# Patient Record
Sex: FEMALE | Race: WHITE | NOT HISPANIC OR LATINO | Employment: PART TIME | URBAN - METROPOLITAN AREA
[De-identification: names, ages, dates, MRNs, and addresses within clinical notes are randomized per-mention and may not be internally consistent; named-entity substitution may affect disease eponyms.]

---

## 2017-02-10 ENCOUNTER — TRANSCRIBE ORDERS (OUTPATIENT)
Dept: OCCUPATIONAL MEDICINE | Facility: CLINIC | Age: 32
End: 2017-02-10

## 2017-02-10 ENCOUNTER — APPOINTMENT (OUTPATIENT)
Dept: OCCUPATIONAL MEDICINE | Facility: CLINIC | Age: 32
End: 2017-02-10

## 2017-02-10 DIAGNOSIS — Z02.1 PRE-EMPLOYMENT HEALTH SCREENING EXAMINATION: Primary | ICD-10-CM

## 2017-02-10 DIAGNOSIS — Z02.1 PRE-EMPLOYMENT HEALTH SCREENING EXAMINATION: ICD-10-CM

## 2017-02-10 PROCEDURE — 86787 VARICELLA-ZOSTER ANTIBODY: CPT

## 2017-02-10 PROCEDURE — 86480 TB TEST CELL IMMUN MEASURE: CPT

## 2017-02-10 PROCEDURE — 86765 RUBEOLA ANTIBODY: CPT

## 2017-02-10 PROCEDURE — 86762 RUBELLA ANTIBODY: CPT

## 2017-02-10 PROCEDURE — 36415 COLL VENOUS BLD VENIPUNCTURE: CPT

## 2017-02-10 PROCEDURE — 86735 MUMPS ANTIBODY: CPT

## 2017-02-11 LAB — RUBV IGG SERPL IA-ACNC: 31.9 IU/ML

## 2017-02-14 LAB
ANNOTATION COMMENT IMP: NORMAL
GAMMA INTERFERON BACKGROUND BLD IA-ACNC: 0.05 IU/ML
M TB IFN-G BLD-IMP: NEGATIVE
M TB IFN-G CD4+ BCKGRND COR BLD-ACNC: 0 IU/ML
M TB IFN-G CD4+ T-CELLS BLD-ACNC: 0.05 IU/ML
MEV IGG SER QL: ABNORMAL
MITOGEN IGNF BLD-ACNC: >10 IU/ML
MUV IGG SER QL: NORMAL
QUANTIFERON-TB GOLD IN TUBE: NORMAL
SERVICE CMNT-IMP: NORMAL
VZV IGG SER IA-ACNC: NORMAL

## 2017-05-04 ENCOUNTER — ALLSCRIPTS OFFICE VISIT (OUTPATIENT)
Dept: OTHER | Facility: OTHER | Age: 32
End: 2017-05-04

## 2017-05-12 ENCOUNTER — ALLSCRIPTS OFFICE VISIT (OUTPATIENT)
Dept: OTHER | Facility: OTHER | Age: 32
End: 2017-05-12

## 2017-05-19 ENCOUNTER — GENERIC CONVERSION - ENCOUNTER (OUTPATIENT)
Dept: OTHER | Facility: OTHER | Age: 32
End: 2017-05-19

## 2017-05-19 LAB
EXTERNAL HEPATITIS B SURFACE ANTIGEN: NEGATIVE
EXTERNAL RUBELLA IGG QUANTITATION: NORMAL

## 2017-05-20 LAB
ABO GROUP BLD: ABNORMAL
BACTERIA UR QL AUTO: ABNORMAL
BASOPHILS # BLD AUTO: 0 %
BASOPHILS # BLD AUTO: 0 X10E3/UL (ref 0–0.2)
BILIRUB UR QL STRIP: NEGATIVE
BLD GP AB SCN SERPL QL: NEGATIVE
COLOR UR: YELLOW
COMMENT (HISTORICAL): CLEAR
DEPRECATED RDW RBC AUTO: 12.6 % (ref 12.3–15.4)
EOSINOPHIL # BLD AUTO: 0.1 X10E3/UL (ref 0–0.4)
EOSINOPHIL # BLD AUTO: 1 %
FECAL OCCULT BLOOD DIAGNOSTIC (HISTORICAL): NEGATIVE
GLUCOSE (HISTORICAL): NEGATIVE
HCT VFR BLD AUTO: 37.4 % (ref 34–46.6)
HEPATITIS B SURFACE ANTIGEN (HISTORICAL): NEGATIVE
HGB BLD-MCNC: 12.4 G/DL (ref 11.1–15.9)
HIV-1/2 AB-AG (HISTORICAL): NON REACTIVE
IMM.GRANULOCYTES (CD4/8) (HISTORICAL): 0 %
IMM.GRANULOCYTES (CD4/8) (HISTORICAL): 0 X10E3/UL (ref 0–0.1)
KETONES UR STRIP-MCNC: NEGATIVE MG/DL
LEUKOCYTE ESTERASE UR QL STRIP: NEGATIVE
LYMPHOCYTES # BLD AUTO: 2.2 X10E3/UL (ref 0.7–3.1)
LYMPHOCYTES # BLD AUTO: 24 %
MCH RBC QN AUTO: 32.5 PG (ref 26.6–33)
MCHC RBC AUTO-ENTMCNC: 33.2 G/DL (ref 31.5–35.7)
MCV RBC AUTO: 98 FL (ref 79–97)
MICROSCOPIC EXAMINATION (HISTORICAL): ABNORMAL
MICROSCOPIC EXAMINATION (HISTORICAL): ABNORMAL
MONOCYTES # BLD AUTO: 0.5 X10E3/UL (ref 0.1–0.9)
MONOCYTES (HISTORICAL): 6 %
NEUTROPHILS # BLD AUTO: 6.2 X10E3/UL (ref 1.4–7)
NEUTROPHILS # BLD AUTO: 69 %
NITRITE UR QL STRIP: NEGATIVE
NON-SQ EPI CELLS URNS QL MICRO: ABNORMAL /HPF
PH UR STRIP.AUTO: 7 [PH] (ref 5–7.5)
PLATELET # BLD AUTO: 230 X10E3/UL (ref 150–379)
PROT UR STRIP-MCNC: NEGATIVE MG/DL
RBC (HISTORICAL): 3.81 X10E6/UL (ref 3.77–5.28)
RBC (HISTORICAL): ABNORMAL /HPF
RH BLD: POSITIVE
RPR SCREEN (HISTORICAL): NON REACTIVE
RUBELLA, IGG (HISTORICAL): 1.5 INDEX
SP GR UR STRIP.AUTO: <=1.005 (ref 1–1.03)
URINALYSIS (UA) (HISTORICAL): ABNORMAL
UROBILINOGEN UR QL STRIP.AUTO: 0.2 EU/DL (ref 0.2–1)
WBC # BLD AUTO: 9.1 X10E3/UL (ref 3.4–10.8)
WBC # BLD AUTO: ABNORMAL /HPF

## 2017-05-28 ENCOUNTER — GENERIC CONVERSION - ENCOUNTER (OUTPATIENT)
Dept: OTHER | Facility: OTHER | Age: 32
End: 2017-05-28

## 2017-06-02 ENCOUNTER — ALLSCRIPTS OFFICE VISIT (OUTPATIENT)
Dept: PERINATAL CARE | Facility: CLINIC | Age: 32
End: 2017-06-02
Payer: COMMERCIAL

## 2017-06-02 ENCOUNTER — GENERIC CONVERSION - ENCOUNTER (OUTPATIENT)
Dept: OTHER | Facility: OTHER | Age: 32
End: 2017-06-02

## 2017-06-02 ENCOUNTER — ALLSCRIPTS OFFICE VISIT (OUTPATIENT)
Dept: OTHER | Facility: OTHER | Age: 32
End: 2017-06-02

## 2017-06-02 LAB
GLUCOSE (HISTORICAL): NORMAL
LEUKOCYTE ESTERASE UR QL STRIP: NORMAL
NITRITE UR QL STRIP: NORMAL
PROT UR STRIP-MCNC: NORMAL MG/DL

## 2017-06-02 PROCEDURE — G0463 HOSPITAL OUTPT CLINIC VISIT: HCPCS | Performed by: GENETIC COUNSELOR, MS

## 2017-06-02 PROCEDURE — 76813 OB US NUCHAL MEAS 1 GEST: CPT | Performed by: OBSTETRICS & GYNECOLOGY

## 2017-06-02 PROCEDURE — 99203 OFFICE O/P NEW LOW 30 MIN: CPT | Performed by: GENETIC COUNSELOR, MS

## 2017-06-02 PROCEDURE — 76801 OB US < 14 WKS SINGLE FETUS: CPT | Performed by: OBSTETRICS & GYNECOLOGY

## 2017-06-05 ENCOUNTER — GENERIC CONVERSION - ENCOUNTER (OUTPATIENT)
Dept: OTHER | Facility: OTHER | Age: 32
End: 2017-06-05

## 2017-06-06 LAB
CHLAMYDIA TRACHOMATIS BY MOL. METHOD (HISTORICAL): NEGATIVE
N GONORRHOEAE, AMPLIFIED DNA (HISTORICAL): NEGATIVE

## 2017-06-08 LAB
ADEQUACY: (HISTORICAL): NORMAL
CLINICIAN PROVIDIED ICD 9 OR 10 (HISTORICAL): NORMAL
COMMENT (HISTORICAL): NORMAL
DIAGNOSIS (HISTORICAL): NORMAL
HPV HIGH RISK (HISTORICAL): NEGATIVE
NOTE: (HISTORICAL): NORMAL
PERFORMED BY (HISTORICAL): NORMAL
TEST INFORMATION (HISTORICAL): NORMAL

## 2017-06-26 ENCOUNTER — TRANSCRIBE ORDERS (OUTPATIENT)
Dept: LAB | Facility: CLINIC | Age: 32
End: 2017-06-26

## 2017-06-26 ENCOUNTER — APPOINTMENT (OUTPATIENT)
Dept: LAB | Facility: CLINIC | Age: 32
End: 2017-06-26
Payer: COMMERCIAL

## 2017-06-26 DIAGNOSIS — Z33.1 PREGNANT STATE, INCIDENTAL: ICD-10-CM

## 2017-06-26 DIAGNOSIS — Z33.1 PREGNANT STATE, INCIDENTAL: Primary | ICD-10-CM

## 2017-06-26 PROCEDURE — 36415 COLL VENOUS BLD VENIPUNCTURE: CPT

## 2017-06-27 LAB — SCAN RESULT: NORMAL

## 2017-06-30 ENCOUNTER — GENERIC CONVERSION - ENCOUNTER (OUTPATIENT)
Dept: OTHER | Facility: OTHER | Age: 32
End: 2017-06-30

## 2017-07-07 ENCOUNTER — ALLSCRIPTS OFFICE VISIT (OUTPATIENT)
Dept: OTHER | Facility: OTHER | Age: 32
End: 2017-07-07

## 2017-07-07 LAB
COLOR UR: CLEAR
GLUCOSE (HISTORICAL): NEGATIVE
LEUKOCYTE ESTERASE UR QL STRIP: NEGATIVE
NITRITE UR QL STRIP: NEGATIVE
PROT UR STRIP-MCNC: NEGATIVE MG/DL

## 2017-07-14 ENCOUNTER — ALLSCRIPTS OFFICE VISIT (OUTPATIENT)
Dept: OTHER | Facility: OTHER | Age: 32
End: 2017-07-14

## 2017-07-25 ENCOUNTER — GENERIC CONVERSION - ENCOUNTER (OUTPATIENT)
Dept: OTHER | Facility: OTHER | Age: 32
End: 2017-07-25

## 2017-07-25 ENCOUNTER — ALLSCRIPTS OFFICE VISIT (OUTPATIENT)
Dept: PERINATAL CARE | Facility: CLINIC | Age: 32
End: 2017-07-25
Payer: COMMERCIAL

## 2017-07-25 PROCEDURE — 76817 TRANSVAGINAL US OBSTETRIC: CPT | Performed by: OBSTETRICS & GYNECOLOGY

## 2017-07-25 PROCEDURE — 76805 OB US >/= 14 WKS SNGL FETUS: CPT | Performed by: OBSTETRICS & GYNECOLOGY

## 2017-08-01 ENCOUNTER — ALLSCRIPTS OFFICE VISIT (OUTPATIENT)
Dept: OTHER | Facility: OTHER | Age: 32
End: 2017-08-01

## 2017-08-01 LAB
GLUCOSE (HISTORICAL): NEGATIVE
LEUKOCYTE ESTERASE UR QL STRIP: NEGATIVE
NITRITE UR QL STRIP: NEGATIVE
PROT UR STRIP-MCNC: NEGATIVE MG/DL

## 2017-08-29 ENCOUNTER — ALLSCRIPTS OFFICE VISIT (OUTPATIENT)
Dept: OTHER | Facility: OTHER | Age: 32
End: 2017-08-29

## 2017-09-11 ENCOUNTER — HOSPITAL ENCOUNTER (OUTPATIENT)
Facility: HOSPITAL | Age: 32
Discharge: HOME | End: 2017-09-11
Attending: OBSTETRICS & GYNECOLOGY | Admitting: OBSTETRICS & GYNECOLOGY
Payer: COMMERCIAL

## 2017-09-11 VITALS
BODY MASS INDEX: 25.48 KG/M2 | WEIGHT: 182 LBS | HEART RATE: 71 BPM | SYSTOLIC BLOOD PRESSURE: 134 MMHG | DIASTOLIC BLOOD PRESSURE: 60 MMHG | OXYGEN SATURATION: 97 % | RESPIRATION RATE: 18 BRPM | TEMPERATURE: 97.5 F | HEIGHT: 71 IN

## 2017-09-11 DIAGNOSIS — O36.8190 DECREASED FETAL MOVEMENT: ICD-10-CM

## 2017-09-11 DIAGNOSIS — Z3A.25 25 WEEKS GESTATION OF PREGNANCY: ICD-10-CM

## 2017-09-11 PROCEDURE — 99214 OFFICE O/P EST MOD 30 MIN: CPT

## 2017-09-11 PROCEDURE — 59025 FETAL NON-STRESS TEST: CPT | Performed by: OBSTETRICS & GYNECOLOGY

## 2017-09-11 PROCEDURE — 76815 OB US LIMITED FETUS(S): CPT | Performed by: OBSTETRICS & GYNECOLOGY

## 2017-09-11 RX ORDER — FOLIC ACID 1 MG/1
1 TABLET ORAL DAILY
COMMUNITY
End: 2017-11-29 | Stop reason: HOSPADM

## 2017-09-18 ENCOUNTER — TRANSCRIBE ORDERS (OUTPATIENT)
Dept: LAB | Facility: CLINIC | Age: 32
End: 2017-09-18

## 2017-09-18 ENCOUNTER — APPOINTMENT (OUTPATIENT)
Dept: LAB | Facility: CLINIC | Age: 32
End: 2017-09-18
Payer: COMMERCIAL

## 2017-09-18 DIAGNOSIS — Z34.90 PREGNANCY WITH ONE FETUS, UNSPECIFIED TRIMESTER: ICD-10-CM

## 2017-09-18 DIAGNOSIS — Z34.90 PREGNANCY WITH ONE FETUS, UNSPECIFIED TRIMESTER: Primary | ICD-10-CM

## 2017-09-18 LAB
BASOPHILS # BLD AUTO: 0.02 THOUSANDS/ΜL (ref 0–0.1)
BASOPHILS NFR BLD AUTO: 0 % (ref 0–1)
EOSINOPHIL # BLD AUTO: 0.2 THOUSAND/ΜL (ref 0–0.61)
EOSINOPHIL NFR BLD AUTO: 2 % (ref 0–6)
ERYTHROCYTE [DISTWIDTH] IN BLOOD BY AUTOMATED COUNT: 12.5 % (ref 11.6–15.1)
GLUCOSE 1H P 50 G GLC PO SERPL-MCNC: 138 MG/DL
HCT VFR BLD AUTO: 34.2 % (ref 34.8–46.1)
HGB BLD-MCNC: 12 G/DL (ref 11.5–15.4)
LYMPHOCYTES # BLD AUTO: 1.97 THOUSANDS/ΜL (ref 0.6–4.47)
LYMPHOCYTES NFR BLD AUTO: 23 % (ref 14–44)
MCH RBC QN AUTO: 33.8 PG (ref 26.8–34.3)
MCHC RBC AUTO-ENTMCNC: 35.1 G/DL (ref 31.4–37.4)
MCV RBC AUTO: 96 FL (ref 82–98)
MONOCYTES # BLD AUTO: 0.56 THOUSAND/ΜL (ref 0.17–1.22)
MONOCYTES NFR BLD AUTO: 7 % (ref 4–12)
NEUTROPHILS # BLD AUTO: 5.85 THOUSANDS/ΜL (ref 1.85–7.62)
NEUTS SEG NFR BLD AUTO: 68 % (ref 43–75)
NRBC BLD AUTO-RTO: 0 /100 WBCS
PLATELET # BLD AUTO: 201 THOUSANDS/UL (ref 149–390)
PMV BLD AUTO: 11.2 FL (ref 8.9–12.7)
RBC # BLD AUTO: 3.55 MILLION/UL (ref 3.81–5.12)
WBC # BLD AUTO: 8.66 THOUSAND/UL (ref 4.31–10.16)

## 2017-09-18 PROCEDURE — 82950 GLUCOSE TEST: CPT

## 2017-09-18 PROCEDURE — 85025 COMPLETE CBC W/AUTO DIFF WBC: CPT

## 2017-09-18 PROCEDURE — 86592 SYPHILIS TEST NON-TREP QUAL: CPT

## 2017-09-18 PROCEDURE — 87389 HIV-1 AG W/HIV-1&-2 AB AG IA: CPT

## 2017-09-18 PROCEDURE — 36415 COLL VENOUS BLD VENIPUNCTURE: CPT

## 2017-09-19 ENCOUNTER — GENERIC CONVERSION - ENCOUNTER (OUTPATIENT)
Dept: OTHER | Facility: OTHER | Age: 32
End: 2017-09-19

## 2017-09-19 DIAGNOSIS — R73.02 IMPAIRED GLUCOSE TOLERANCE: ICD-10-CM

## 2017-09-19 LAB — RPR SER QL: NORMAL

## 2017-09-20 LAB — HIV 1+2 AB+HIV1 P24 AG SERPL QL IA: NORMAL

## 2017-09-23 ENCOUNTER — TRANSCRIBE ORDERS (OUTPATIENT)
Dept: LAB | Facility: HOSPITAL | Age: 32
End: 2017-09-23

## 2017-09-24 ENCOUNTER — APPOINTMENT (OUTPATIENT)
Dept: LAB | Facility: HOSPITAL | Age: 32
End: 2017-09-24
Attending: NURSE PRACTITIONER
Payer: COMMERCIAL

## 2017-09-24 DIAGNOSIS — R73.02 IMPAIRED GLUCOSE TOLERANCE: ICD-10-CM

## 2017-09-24 LAB
GLUCOSE 1H P 100 G GLC PO SERPL-MCNC: 147 MG/DL (ref 65–179)
GLUCOSE 2H P 100 G GLC PO SERPL-MCNC: 144 MG/DL (ref 65–154)
GLUCOSE 3H P 100 G GLC PO SERPL-MCNC: 105 MG/DL (ref 65–139)
GLUCOSE P FAST SERPL-MCNC: 83 MG/DL (ref 65–99)

## 2017-09-24 PROCEDURE — 82948 REAGENT STRIP/BLOOD GLUCOSE: CPT

## 2017-09-24 PROCEDURE — 36415 COLL VENOUS BLD VENIPUNCTURE: CPT

## 2017-09-24 PROCEDURE — 82951 GLUCOSE TOLERANCE TEST (GTT): CPT

## 2017-09-24 PROCEDURE — 82952 GTT-ADDED SAMPLES: CPT

## 2017-09-26 ENCOUNTER — ALLSCRIPTS OFFICE VISIT (OUTPATIENT)
Dept: OTHER | Facility: OTHER | Age: 32
End: 2017-09-26

## 2017-09-26 LAB
GLUCOSE (HISTORICAL): NEGATIVE
GLUCOSE SERPL-MCNC: 75 MG/DL (ref 65–140)
LEUKOCYTE ESTERASE UR QL STRIP: NEGATIVE
NITRITE UR QL STRIP: NEGATIVE
PROT UR STRIP-MCNC: NEGATIVE MG/DL

## 2017-09-29 ENCOUNTER — ALLSCRIPTS OFFICE VISIT (OUTPATIENT)
Dept: OTHER | Facility: OTHER | Age: 32
End: 2017-09-29

## 2017-10-24 ENCOUNTER — ALLSCRIPTS OFFICE VISIT (OUTPATIENT)
Dept: PERINATAL CARE | Facility: CLINIC | Age: 32
End: 2017-10-24
Payer: COMMERCIAL

## 2017-10-24 ENCOUNTER — ALLSCRIPTS OFFICE VISIT (OUTPATIENT)
Dept: OTHER | Facility: OTHER | Age: 32
End: 2017-10-24

## 2017-10-24 LAB
GLUCOSE (HISTORICAL): NORMAL
LEUKOCYTE ESTERASE UR QL STRIP: NEGATIVE
NITRITE UR QL STRIP: NEGATIVE
PROT UR STRIP-MCNC: NEGATIVE MG/DL

## 2017-10-24 PROCEDURE — 76816 OB US FOLLOW-UP PER FETUS: CPT | Performed by: OBSTETRICS & GYNECOLOGY

## 2017-11-03 LAB — EXTERNAL GROUP B STREP ANTIGEN: NEGATIVE

## 2017-11-10 ENCOUNTER — ALLSCRIPTS OFFICE VISIT (OUTPATIENT)
Dept: OTHER | Facility: OTHER | Age: 32
End: 2017-11-10

## 2017-11-10 ENCOUNTER — GENERIC CONVERSION - ENCOUNTER (OUTPATIENT)
Dept: OTHER | Facility: OTHER | Age: 32
End: 2017-11-10

## 2017-11-13 LAB — CULTURE GENITAL-BSB ON (HISTORICAL): NEGATIVE

## 2017-11-20 ENCOUNTER — GENERIC CONVERSION - ENCOUNTER (OUTPATIENT)
Dept: OTHER | Facility: OTHER | Age: 32
End: 2017-11-20

## 2017-11-28 ENCOUNTER — HOSPITAL ENCOUNTER (INPATIENT)
Facility: HOSPITAL | Age: 32
LOS: 1 days | Discharge: HOME | End: 2017-11-29
Attending: OBSTETRICS & GYNECOLOGY | Admitting: OBSTETRICS & GYNECOLOGY
Payer: COMMERCIAL

## 2017-11-28 LAB
ABO GROUP BLD: NORMAL
BASE EXCESS BLDCOA CALC-SCNC: -3.7 MMOL/L (ref 3–11)
BASE EXCESS BLDCOV CALC-SCNC: -1.5 MMOL/L (ref 1–9)
BASOPHILS # BLD MANUAL: 0 THOUSAND/UL (ref 0–0.1)
BASOPHILS NFR MAR MANUAL: 0 % (ref 0–1)
BLD GP AB SCN SERPL QL: POSITIVE
BLOOD GROUP ANTIBODIES SERPL: NORMAL
EOSINOPHIL # BLD MANUAL: 0.89 THOUSAND/UL (ref 0–0.4)
EOSINOPHIL NFR BLD MANUAL: 9 % (ref 0–6)
ERYTHROCYTE [DISTWIDTH] IN BLOOD BY AUTOMATED COUNT: 12.6 % (ref 11.6–15.1)
HCO3 BLDCOA-SCNC: 24.4 MMOL/L (ref 17.3–27.3)
HCO3 BLDCOV-SCNC: 22 MMOL/L (ref 12.2–28.6)
HCT VFR BLD AUTO: 40.2 % (ref 34.8–46.1)
HGB BLD-MCNC: 14.4 G/DL (ref 11.5–15.4)
LITTLE C AG RBC QL: NEGATIVE
LYMPHOCYTES # BLD AUTO: 3.18 THOUSAND/UL (ref 0.6–4.47)
LYMPHOCYTES # BLD AUTO: 32 % (ref 14–44)
MCH RBC QN AUTO: 34.2 PG (ref 26.8–34.3)
MCHC RBC AUTO-ENTMCNC: 35.8 G/DL (ref 31.4–37.4)
MCV RBC AUTO: 96 FL (ref 82–98)
MONOCYTES # BLD AUTO: 0.3 THOUSAND/UL (ref 0–1.22)
MONOCYTES NFR BLD: 3 % (ref 4–12)
NEUTROPHILS # BLD MANUAL: 5.57 THOUSAND/UL (ref 1.85–7.62)
NEUTS SEG NFR BLD AUTO: 56 % (ref 43–75)
NRBC BLD AUTO-RTO: 0 /100 WBCS
O2 CT VFR BLDCOA CALC: 4.3 ML/DL
OXYHGB MFR BLDCOA: 16.7 %
OXYHGB MFR BLDCOV: 60.4 %
PCO2 BLDCOA: 55.3 MM[HG] (ref 30–60)
PCO2 BLDCOV: 34.1 MM HG (ref 27–43)
PH BLDCOA: 7.26 [PH] (ref 7.23–7.43)
PH BLDCOV: 7.43 [PH] (ref 7.19–7.49)
PLATELET # BLD AUTO: 206 THOUSANDS/UL (ref 149–390)
PLATELET BLD QL SMEAR: ADEQUATE
PMV BLD AUTO: 12.2 FL (ref 8.9–12.7)
PO2 BLDCOA: 11.6 MM HG (ref 5–25)
PO2 BLDCOV: 23 MM HG (ref 15–45)
RBC # BLD AUTO: 4.21 MILLION/UL (ref 3.81–5.12)
RH BLD: POSITIVE
RPR SER QL: NORMAL
SAO2 % BLDCOV: 14.3 ML/DL
SPECIMEN EXPIRATION DATE: NORMAL
TOTAL CELLS COUNTED SPEC: 100
WBC # BLD AUTO: 9.94 THOUSAND/UL (ref 4.31–10.16)

## 2017-11-28 PROCEDURE — 86592 SYPHILIS TEST NON-TREP QUAL: CPT | Performed by: OBSTETRICS & GYNECOLOGY

## 2017-11-28 PROCEDURE — 85027 COMPLETE CBC AUTOMATED: CPT | Performed by: OBSTETRICS & GYNECOLOGY

## 2017-11-28 PROCEDURE — 86901 BLOOD TYPING SEROLOGIC RH(D): CPT | Performed by: OBSTETRICS & GYNECOLOGY

## 2017-11-28 PROCEDURE — 82805 BLOOD GASES W/O2 SATURATION: CPT | Performed by: OBSTETRICS & GYNECOLOGY

## 2017-11-28 PROCEDURE — 86900 BLOOD TYPING SEROLOGIC ABO: CPT | Performed by: OBSTETRICS & GYNECOLOGY

## 2017-11-28 PROCEDURE — 86850 RBC ANTIBODY SCREEN: CPT | Performed by: OBSTETRICS & GYNECOLOGY

## 2017-11-28 PROCEDURE — 85007 BL SMEAR W/DIFF WBC COUNT: CPT | Performed by: OBSTETRICS & GYNECOLOGY

## 2017-11-28 PROCEDURE — 99214 OFFICE O/P EST MOD 30 MIN: CPT

## 2017-11-28 PROCEDURE — 86870 RBC ANTIBODY IDENTIFICATION: CPT | Performed by: OBSTETRICS & GYNECOLOGY

## 2017-11-28 RX ORDER — DIPHENHYDRAMINE HCL 25 MG
25 TABLET ORAL EVERY 6 HOURS PRN
Status: DISCONTINUED | OUTPATIENT
Start: 2017-11-28 | End: 2017-11-29 | Stop reason: HOSPADM

## 2017-11-28 RX ORDER — CALCIUM CARBONATE 200(500)MG
1000 TABLET,CHEWABLE ORAL DAILY PRN
Status: DISCONTINUED | OUTPATIENT
Start: 2017-11-28 | End: 2017-11-29 | Stop reason: HOSPADM

## 2017-11-28 RX ORDER — ACETAMINOPHEN 325 MG/1
650 TABLET ORAL EVERY 6 HOURS PRN
Status: DISCONTINUED | OUTPATIENT
Start: 2017-11-28 | End: 2017-11-29 | Stop reason: HOSPADM

## 2017-11-28 RX ORDER — OXYTOCIN/RINGER'S LACTATE 30/500 ML
250 PLASTIC BAG, INJECTION (ML) INTRAVENOUS CONTINUOUS
Status: DISCONTINUED | OUTPATIENT
Start: 2017-11-28 | End: 2017-11-29 | Stop reason: HOSPADM

## 2017-11-28 RX ORDER — OXYCODONE HYDROCHLORIDE AND ACETAMINOPHEN 5; 325 MG/1; MG/1
1 TABLET ORAL EVERY 4 HOURS PRN
Status: DISCONTINUED | OUTPATIENT
Start: 2017-11-28 | End: 2017-11-29 | Stop reason: HOSPADM

## 2017-11-28 RX ORDER — DOCUSATE SODIUM 100 MG/1
100 CAPSULE, LIQUID FILLED ORAL 2 TIMES DAILY
Status: DISCONTINUED | OUTPATIENT
Start: 2017-11-28 | End: 2017-11-29 | Stop reason: HOSPADM

## 2017-11-28 RX ORDER — OXYTOCIN/RINGER'S LACTATE 30/500 ML
PLASTIC BAG, INJECTION (ML) INTRAVENOUS
Status: COMPLETED
Start: 2017-11-28 | End: 2017-11-28

## 2017-11-28 RX ORDER — FOLIC ACID 1 MG/1
1 TABLET ORAL DAILY
Status: DISCONTINUED | OUTPATIENT
Start: 2017-11-28 | End: 2017-11-28

## 2017-11-28 RX ORDER — LIDOCAINE HYDROCHLORIDE 10 MG/ML
INJECTION, SOLUTION EPIDURAL; INFILTRATION; INTRACAUDAL; PERINEURAL
Status: DISCONTINUED
Start: 2017-11-28 | End: 2017-11-28 | Stop reason: WASHOUT

## 2017-11-28 RX ORDER — IBUPROFEN 600 MG/1
600 TABLET ORAL EVERY 6 HOURS PRN
Status: DISCONTINUED | OUTPATIENT
Start: 2017-11-28 | End: 2017-11-29 | Stop reason: HOSPADM

## 2017-11-28 RX ORDER — SODIUM CHLORIDE, SODIUM LACTATE, POTASSIUM CHLORIDE, CALCIUM CHLORIDE 600; 310; 30; 20 MG/100ML; MG/100ML; MG/100ML; MG/100ML
125 INJECTION, SOLUTION INTRAVENOUS CONTINUOUS
Status: DISCONTINUED | OUTPATIENT
Start: 2017-11-28 | End: 2017-11-28

## 2017-11-28 RX ORDER — OXYCODONE HYDROCHLORIDE AND ACETAMINOPHEN 5; 325 MG/1; MG/1
2 TABLET ORAL EVERY 4 HOURS PRN
Status: DISCONTINUED | OUTPATIENT
Start: 2017-11-28 | End: 2017-11-29 | Stop reason: HOSPADM

## 2017-11-28 RX ORDER — DIAPER,BRIEF,INFANT-TODD,DISP
1 EACH MISCELLANEOUS AS NEEDED
Status: DISCONTINUED | OUTPATIENT
Start: 2017-11-28 | End: 2017-11-29 | Stop reason: HOSPADM

## 2017-11-28 RX ORDER — ONDANSETRON 2 MG/ML
4 INJECTION INTRAMUSCULAR; INTRAVENOUS EVERY 8 HOURS PRN
Status: DISCONTINUED | OUTPATIENT
Start: 2017-11-28 | End: 2017-11-29 | Stop reason: HOSPADM

## 2017-11-28 RX ADMIN — DOCUSATE SODIUM 100 MG: 100 CAPSULE, LIQUID FILLED ORAL at 09:06

## 2017-11-28 RX ADMIN — DOCUSATE SODIUM 100 MG: 100 CAPSULE, LIQUID FILLED ORAL at 20:26

## 2017-11-28 RX ADMIN — IBUPROFEN 600 MG: 600 TABLET ORAL at 12:33

## 2017-11-28 RX ADMIN — Medication 250 MILLI-UNITS/MIN: at 03:59

## 2017-11-28 RX ADMIN — IBUPROFEN 600 MG: 600 TABLET ORAL at 04:35

## 2017-11-28 RX ADMIN — BENZOCAINE AND MENTHOL 1 APPLICATION: 20; .5 SPRAY TOPICAL at 06:48

## 2017-11-28 NOTE — L&D DELIVERY NOTE
DELIVERY NOTE  Viri Boothe 34 y o  female MRN: [id number]  Unit/Bed#: -01 Encounter: [id number]    Obstetrician:   Dr Neema Sommer    Assistant: Dr Tony Miranda    Pre-Delivery Diagnosis: Term pregnancy  Spontaneous labor  Single fetus  SROM    Post-Delivery Diagnosis: Same as above - Delivered    Procedure: Spontaneous vaginal delivery    Indications for instrumental delivery: None    Estimated Blood Loss:  300mL     Cord Blood Gases:   Arterial pH:7 263, BE: -3 7  Venous pH: 7 427, BE: -1 5    Complications:  None    Description of Delivery:   Pt is a 31yo  at 38w3d that presented to L&D in active labor  Initial exam was complete and +1 station  Membranes still in tact  She was moved to a labor room where she felt urge to push  Pt Luiz Cortes 668 for clear fluid @0356  Patient delivered a viable Female  over intact perineum @ [phone], APGAR 9/9  A nuchal cord was not noted  With the assistance of maternal expulsive efforts and downward traction of fetal head, the anterior shoulder was delivered without difficulty, followed by the remainder of the infant's body  The  was placed on the mother's chest and provided routine care by the  staff  There was delayed clamping of the umbilical cord, after which, was doubly clamped and cut  Umbilical cord blood and umbilical artery and venous gases were collected  Placenta was delivered with fundal massage and gentle traction on the cord with active management of the third stage of labor  Placenta delivered intact with a 3-vessel cord @ 0409  Active management of the third stage of labor was undertaken with IV pitocin  Bleeding was noted to be under control  Inspection of the perineum, vagina, labia, and urethra revealed no lacerations requiring repair  Mother and baby are currently recovering nicely in stable condition  Dr Neeam Sommer was present and participated in the entire delivery      Mindy Larsen MD  OBGYN, PGY-1  2017 5:10 AM

## 2017-11-28 NOTE — PLAN OF CARE
BIRTH - VAGINAL/ SECTION     Fetal and maternal status remain reassuring during the birth process Completed     Emotionally satisfying birthing experience for mother/fetus Completed          DISCHARGE PLANNING     Discharge to home or other facility with appropriate resources Progressing        INFECTION - ADULT     Absence or prevention of progression during hospitalization Progressing     Absence of fever/infection during neutropenic period Progressing        Knowledge Deficit     Patient/family/caregiver demonstrates understanding of disease process, treatment plan, medications, and discharge instructions Progressing        PAIN - ADULT     Verbalizes/displays adequate comfort level or baseline comfort level Progressing        SAFETY ADULT     Patient will remain free of falls Progressing     Maintain or return to baseline ADL function Progressing     Maintain or return mobility status to optimal level Progressing

## 2017-11-28 NOTE — H&P
H&P Exam - Obstetrics   Dayron Manual Shyann 34 y o  female MRN: [id number]  Unit/Bed#: -01 Encounter: [id number]    >2 Midnights    INPATIENT     History of Present Illness   Chief Complaint: Active labor    HPI:  Brooks Leyva is a 34 y o   female with an LYN of 2017, by Last Menstrual Period at 38w3d weeks gestation who is being admitted for Active labor  Contractions: Frequency: Every 5 minutes  Leakage of fluid: None  Bleeding: None  Fetal movement: present  Her current obstetrical history is significant for hx of bladder prolapse, umbilical and inguinal hernia, varicose veins  Review of Systems   Constitutional: Negative for chills and fever  Respiratory: Negative for shortness of breath  Cardiovascular: Negative for chest pain  Gastrointestinal: Negative for nausea and vomiting  Ctx   Genitourinary: Negative for vaginal bleeding  Neurological: Negative for headaches  Historical Information   OB History    Para Term  AB Living   1 1 1     1   SAB TAB Ectopic Multiple Live Births         0 1      # Outcome Date GA Lbr Gene/2nd Weight Sex Delivery Anes PTL Lv   1 Term 17 38w3d / 00:17  F Vag-Spont None N LIZZETTE        Baby complications/comments: EFW: 8lbs by maternal estimation  No past medical history on file  No past surgical history on file  Social History   History   Alcohol use Not on file     History   Drug use: Unknown     Smoking: none  Family History: non-contributory    Meds/Allergies   {  Prescriptions Prior to Admission   Medication    folic acid (FOLVITE) 1 mg tablet    Prenatal Vit-Fe Fumarate-FA (PRENATAL 1+1 PO)     No Known Allergies    Objective   Vitals: Blood pressure 146/80, pulse 89, temperature (!) 97 4 °F (36 3 °C), temperature source Oral, resp  rate 20, last menstrual period 2017, SpO2 100 %, unknown if currently breastfeeding  There is no height or weight on file to calculate BMI      Invasive Devices Peripheral Intravenous Line            Peripheral IV 17 Right Hand less than 1 day                Physical Exam   Constitutional: She is oriented to person, place, and time  She appears well-developed and well-nourished  Very uncomfortable during ctx   HENT:   Head: Normocephalic and atraumatic  Cardiovascular: Normal rate and regular rhythm  Pulmonary/Chest: Effort normal and breath sounds normal    Abdominal: Soft  Gravid uterus   Genitourinary: Vagina normal    Musculoskeletal:   Varicose veins on LE   Neurological: She is alert and oriented to person, place, and time  Skin: She is not diaphoretic  Psychiatric: She has a normal mood and affect  Her behavior is normal      Vaginal Exam  Dilation: 10  Dilation Complete Date: 17  Dilation Complete Time: 0340  Effacement (%): 100  Station: 0  Presentation: Vertex  Method: Manual  OB Examiner: Beverely Needs  Membranes: Intact  FHR: Baseline: 130 bpm, Variability: Good {> 6 bpm), Accelerations: Reactive and Decelerations: Absent  [place]: Not on monitor long enough to trace    Prenatal Labs:   Blood Type: O  , D (Rh type): positive  , Antibody Screen: negative , Rubella:   Lab Results   Component Value Date/Time    Rubella IgG Quant 31 9 02/10/2017 10:00 AM        , VDRL/RPR:   Lab Results   Component Value Date/Time    RPR Non-Reactive 2017 01:32 PM      , Hep B: negative  , HIV:   Lab Results   Component Value Date/Time    HIV-1/HIV-2 Ab Non-Reactive 2017 01:32 PM     , Chlamydia: negative  , Gonorrhea: negative  , Group B Strep:  negative       Imaging, EKG, Pathology, and Other Studies: I have personally reviewed pertinent reports        Assessment/Plan     Assessment:  IUP at 38w3d in Labor  Plan:  1) Admit to L&D  2) CBC, T&S, RPR  3) Anticipate     D/w Dr Dena Irby MD  OBGYN, PGY-1  2017 4:29 AM

## 2017-11-28 NOTE — DISCHARGE INSTRUCTIONS
Vaginal Delivery   WHAT YOU SHOULD KNOW:   A vaginal delivery is the birth of your baby through your vagina (birth canal)  AFTER YOU LEAVE:   Medicines:  · NSAIDs  help decrease swelling and pain or fever  This medicine is available with or without a doctor's order  NSAIDs can cause stomach bleeding or kidney problems in certain people  If you take blood thinner medicine, always ask your healthcare provider if NSAIDs are safe for you  Always read the medicine label and follow directions  · Take your medicine as directed  Call your healthcare provider if you think your medicine is not helping or if you have side effects  Tell him if you are allergic to any medicine  Keep a list of the medicines, vitamins, and herbs you take  Include the amounts, and when and why you take them  Bring the list or the pill bottles to follow-up visits  Carry your medicine list with you in case of an emergency  Follow up with your primary healthcare provider:  Most women need to return 6 weeks after a vaginal delivery  Ask about how to care for your wounds or stitches  Write down your questions so you remember to ask them during your visits  Activity:  Rest as much as possible  Try to keep all activities short  You may be able to do some exercise soon after you have your baby  Talk with your primary healthcare provider before you start exercising  If you work outside the home, ask when you can return to your job  Kegel exercises:  Kegel exercises may help your vaginal and rectal muscles heal faster  You can do Kegel exercises by tightening and relaxing the muscles around your vagina  Kegel exercises help make the muscles stronger  Breast care:  When your milk comes in, your breasts may feel full and hard  Ask how to care for your breasts, even if you are not breastfeeding  Constipation:  Do not try to push the bowel movement out if it is too hard   High-fiber foods, extra liquids, and regular exercise can help you prevent constipation  Examples of high-fiber foods are fruit and bran  Prune juice and water are good liquids to drink  Regular exercise helps your digestive system work  You may also be told to take over-the-counter fiber and stool softener medicines  Take these items as directed  Hemorrhoids:  Pregnancy can cause severe hemorrhoids  You may have rectal pain because of the hemorrhoids  Ask how to prevent or treat hemorrhoids  Perineum care: Your perineum is the area between your vagina and anus  Keep the area clean and dry to help it heal and to prevent infection  Wash the area gently with soap and water when you bathe or shower  Rinse your perineum with warm water when you use the toilet  Your primary healthcare provider may suggest you use a warm sitz bath to help decrease pain  A sitz bath is a bathtub or basin filled to hip level  Stay in the sitz bath for 20 to 30 minutes, or as directed  Vaginal discharge: You will have vaginal discharge, called lochia, after your delivery  The lochia is bright red the first day or two after the birth  By the fourth day, the amount decreases, and it turns red-brown  Use a sanitary pad rather than a tampon to prevent a vaginal infection  It is normal to have lochia up to 8 weeks after your baby is born  Monthly periods: Your period may start again within 7 to 12 weeks after your baby is born  If you are breastfeeding, it may take longer for your period to start again  You can still get pregnant again even though you do not have your monthly period  Talk with your primary healthcare provider about a birth control method that will be good for you if you do not want to get pregnant  Mood changes: Many new mothers have some kind of mood changes after delivery  Some of these changes occur because of lack of sleep, hormone changes, and caring for a new baby  Some mood changes can be more serious, such as postpartum depression   Talk with your primary healthcare provider if you feel unable to care for yourself or your baby  Sexual activity:  You may need to avoid sex for 6 to 7 weeks after you have your baby  You may notice you have a decreased desire for sex, or sex may be painful  You may need to use a vaginal lubricant (gel) to help make sex more comfortable  Contact your primary healthcare provider if:   · You have heavy vaginal bleeding that fills 1 or more sanitary pads in 1 hour  · You have a fever  · Your pain does not go away, or gets worse  · The skin between your vagina and rectum is swollen, warm, or red  · You have swollen, hard, or painful breasts  · You feel very sad or depressed  · You feel more tired than usual      · You have questions or concerns about your condition or care  Seek care immediately or call 911 if:   · You have pus or yellow drainage coming from your vagina or wound  · You are urinating very little, or not at all  · Your arm or leg feels warm, tender, and painful  It may look swollen and red  · You feel lightheaded, have sudden and worsening chest pain, or trouble breathing  You may have more pain when you take deep breaths or cough, or you may cough up blood  © 2014 [address] is for End User's use only and may not be sold, redistributed or otherwise used for commercial purposes  All illustrations and images included in CareNotes® are the copyrighted property of [employer]  or Adithya Souza  The above information is an  only  It is not intended as medical advice for individual conditions or treatments  Talk to your doctor, nurse or pharmacist before following any medical regimen to see if it is safe and effective for you

## 2017-11-28 NOTE — DISCHARGE SUMMARY
Discharge Summary - OB/GYN   Becca Boothe 34 y o  female MRN: [id number]  Unit/Bed#: -01 Encounter: [id number]      Admission Date: 2017     Discharge Date: 2017    Admitting Diagnosis:   1  Pregnancy at 38w3d  2  Spontaneous labor    Discharge Diagnosis:   1      Procedures: spontaneous vaginal delivery    Attending:    Hospital Course:     Megha Alvarenga is a 34 y o   at 38w3d wks who was initially admitted for spontaneous labor  She delivered a viable female  on 2017 at [phone]  Weight 8lbs 11oz via spontaneous vaginal delivery  Apgars were 9 (1 min) and 9 (5 min)   was transferred to  nursery  Patient tolerated the procedure well  Her post-partum course was uncomplicated  Her postpartum pain was well controlled with oral analgesics  On day of discharge, she was ambulating and able to reasonably perform all ADLs  She was voiding and had appropriate bowel function  Pain was well controlled  She was discharged home on postpartum day #1 without complications  Patient was instructed to follow up with her OB as an outpatient and was given appropriate warnings to call provider if she develops signs of infection or uncontrolled pain  Complications: none apparent    Condition at discharge: good     Discharge instructions/Information to patient and family:   See after visit summary for information provided to patient and family  Provisions for Follow-Up Care:  See after visit summary for information related to follow-up care and any pertinent home health orders  Disposition: See After Visit Summary for discharge disposition information  Planned Readmission: No    Discharge Medications: For a complete list of the patient's medications, please refer to her med rec      Jose Elias Reed DO

## 2017-11-28 NOTE — LACTATION NOTE
This note was copied from a baby's chart  CONSULT - LACTATION  Baby Girl Navi Oyster 0 days female MRN: [id number]    [address] [facility] Room / Bed:  307(N)/ 307(N) Encounter: [id number]    Maternal Information     MOTHER:  Milton Bryant  Maternal Age: 34 y o    OB History: #: 1, Date: 14, Sex: Male, Weight: None, GA: 40w1d, Delivery: Vaginal, Spontaneous Delivery, Apgar1: None, Apgar5: None, Living: Living, Birth Comments: None    #: 2, Date: 17, Sex: Female, Weight: None, GA: 38w3d, Delivery: Vaginal, Spontaneous Delivery, Apgar1: 9, Apgar5: 9, Living: Living, Birth Comments: None   Previouse breast reduction surgery? No    Lactation history:   Has patient previously breast fed: Yes   How long had patient previously breast fed: 13 months   Previous breast feeding complications: None     Past Surgical History:   Procedure Laterality Date    WISDOM TOOTH EXTRACTION         Birth information:  YOB: 2018   Time of birth: 3:57 AM   Sex: female   Delivery type: Vaginal, Spontaneous Delivery   Birth Weight: No birth weight on file  Percent of Weight Change: Birth weight not on file     Gestational Age: 36w4d   [unfilled]    Assessment     Breast and nipple assessment: not assessed at this time     Assessment: not assessed at this time    Feeding assessment: not assessed at this time    Feeding recommendations:  breast feed on demand     Met with mother  Provided mother with Ready, Set, Baby booklet  Discussed Skin to Skin contact an benefits to mom and baby  Talked about the delay of the first bath until baby has adjusted  Spoke about the benefits of rooming in  Feeding on cue and what that means for recognizing infant's hunger  Avoidance of pacifiers for the first month discussed  Talked about exclusive breastfeeding for the first 6 months      Positioning and Latch reviewed as well as showing images of other feeding positions  Discussed the properties of a good latch in any position  Reviewed hand/manual expression  Discussed s/s that baby is getting enough milk and some s/s that breastfeeding dyad may need further help  Gave information on common concerns, what to expect the first few weeks after delivery, preparing for other caregivers, and how partners can help  Resources for support also provided  Encouraged MOB to call for assistance, questions, and concerns about breastfeeding  Extension provided      Tiki Frank RN 11/28/2017 12:56 PM

## 2017-11-29 VITALS
SYSTOLIC BLOOD PRESSURE: 121 MMHG | RESPIRATION RATE: 18 BRPM | BODY MASS INDEX: 28.28 KG/M2 | TEMPERATURE: 97.5 F | DIASTOLIC BLOOD PRESSURE: 74 MMHG | HEIGHT: 71 IN | OXYGEN SATURATION: 100 % | WEIGHT: 202 LBS | HEART RATE: 67 BPM

## 2017-11-29 RX ORDER — IBUPROFEN 600 MG/1
600 TABLET ORAL EVERY 6 HOURS PRN
Qty: 30 TABLET | Refills: 0 | Status: SHIPPED | OUTPATIENT
Start: 2017-11-29 | End: 2019-11-01 | Stop reason: ALTCHOICE

## 2017-11-29 RX ORDER — DIAPER,BRIEF,INFANT-TODD,DISP
1 EACH MISCELLANEOUS AS NEEDED
Qty: 30 G | Refills: 0 | Status: SHIPPED | OUTPATIENT
Start: 2017-11-29 | End: 2018-10-28 | Stop reason: ALTCHOICE

## 2017-11-29 RX ORDER — ACETAMINOPHEN 325 MG/1
650 TABLET ORAL EVERY 4 HOURS PRN
Qty: 30 TABLET | Refills: 0 | Status: SHIPPED | OUTPATIENT
Start: 2017-11-29 | End: 2018-10-28 | Stop reason: ALTCHOICE

## 2017-11-29 RX ORDER — DOCUSATE SODIUM 100 MG/1
100 CAPSULE, LIQUID FILLED ORAL 2 TIMES DAILY
Qty: 10 CAPSULE | Refills: 0 | Status: SHIPPED | OUTPATIENT
Start: 2017-11-29 | End: 2018-10-28 | Stop reason: ALTCHOICE

## 2017-11-29 RX ADMIN — IBUPROFEN 600 MG: 600 TABLET ORAL at 04:28

## 2017-11-29 RX ADMIN — DOCUSATE SODIUM 100 MG: 100 CAPSULE, LIQUID FILLED ORAL at 08:56

## 2017-11-29 NOTE — LACTATION NOTE
This note was copied from a baby's chart  Met with mother to go over feeding log since birth for the first week  Emphasized 8 or more (12) feedings in a 24 hour period, what to expect for the number of diapers per day of life and the progression of properties of the  stooling pattern  Discussed s/s that breastfeeding is going well after day 4 and when to get help from a pediatrician or lactation support person after day 4  Booklet included Breast Pumping Instructions, When You Go Back to Work or School, and Breastfeeding Resources for after discharge including access to the number for the [employer]  C/O sore nipples  Information given about sore nipples and how to correct with positioning techniques  Discussed maneuvers to latch infant on properly to avoid nipple pain and promote healing  Discussed treatments that could be utilized to promote healing  Encouraged patient to call for assistance with latch  Phone number given  Encouraged MOB to call for assistance, questions, and concerns about breastfeeding  Extension provided

## 2017-11-29 NOTE — PROGRESS NOTES
Progress Note - OB/GYN   Daniela Boothe 34 y o  female MRN: [id number]  Unit/Bed#:  307-01 Encounter: [id number]    Assessment:  PP #1 s/p Spontaneous Vaginal Delivery, stable    Plan:  1  Continue routine postpartum care  2  Encourage ambulation  3  Encourage breastfeeding  4  Pain control as needed    Disposition: stable, anticipate discharge today    Subjective/Objective   Chief Complaint:     PP #1 s/p Spontaneous Vaginal Delivery    Subjective:     Pain: no  Tolerating PO: yes  Voiding: yes  Flatus: yes  BM: no  Ambulating: yes  Breastfeeding: Breastfeeding  Chest pain: no  Shortness of breath: no  Leg pain: no  Lochia: minimal    Objective:     Vitals:  Vitals:    11/28/17 1541 11/28/17 2025 11/29/17 0020 11/29/17 0435   BP: 123/64 147/64 142/74 139/82   Pulse: 62 66 70 66   Resp: 16 18 16 18   Temp: 98 8 °F (37 1 °C) 98 4 °F (36 9 °C) 97 9 °F (36 6 °C) 98 1 °F (36 7 °C)   TempSrc: Oral Oral Oral Oral   SpO2:       Weight:       Height:           Physical Exam:     AAOx3, NAD  CV, RRR  CTA b/l, no WRR  Soft, non-tender, non-distended, no rebound or guarding  Uterine fundus firm and non-tender, -2 cm below the umbilicus   Negative Owen's bilaterally    Lab, Imaging and other studies: I have personally reviewed pertinent reports        Lab Results   Component Value Date    WBC 9 94 11/28/2017    HGB 14 4 11/28/2017    HCT 40 2 11/28/2017    MCV 96 11/28/2017     11/28/2017               Aurea Lukeast, DO  11/29/17

## 2017-11-29 NOTE — PLAN OF CARE
DISCHARGE PLANNING     Discharge to home or other facility with appropriate resources Adequate for Discharge        INFECTION - ADULT     Absence or prevention of progression during hospitalization Adequate for Discharge     Absence of fever/infection during neutropenic period Adequate for Discharge        Knowledge Deficit     Patient/family/caregiver demonstrates understanding of disease process, treatment plan, medications, and discharge instructions Adequate for Discharge        PAIN - ADULT     Verbalizes/displays adequate comfort level or baseline comfort level Adequate for Discharge        POSTPARTUM     Experiences normal postpartum course Adequate for Discharge     Appropriate maternal -  bonding Adequate for Discharge     Establishment of infant feeding pattern Adequate for Discharge     Incision(s), wounds(s) or drain site(s) healing without S/S of infection Adequate for Discharge        SAFETY ADULT     Patient will remain free of falls Adequate for Discharge     Maintain or return to baseline ADL function Adequate for Discharge     Maintain or return mobility status to optimal level Adequate for Discharge

## 2017-11-30 ENCOUNTER — GENERIC CONVERSION - ENCOUNTER (OUTPATIENT)
Dept: OTHER | Facility: OTHER | Age: 32
End: 2017-11-30

## 2017-12-05 LAB — PLACENTA IN STORAGE: NORMAL

## 2017-12-07 ENCOUNTER — HOSPITAL ENCOUNTER (OUTPATIENT)
Facility: HOSPITAL | Age: 32
Setting detail: OUTPATIENT SURGERY
Discharge: HOME | End: 2017-12-08
Attending: EMERGENCY MEDICINE | Admitting: OBSTETRICS & GYNECOLOGY
Payer: COMMERCIAL

## 2017-12-08 ENCOUNTER — APPOINTMENT (EMERGENCY)
Dept: RADIOLOGY | Facility: HOSPITAL | Age: 32
End: 2017-12-08
Payer: COMMERCIAL

## 2017-12-08 ENCOUNTER — ANESTHESIA (EMERGENCY)
Dept: PERIOP | Facility: HOSPITAL | Age: 32
End: 2017-12-08
Payer: COMMERCIAL

## 2017-12-08 ENCOUNTER — ANESTHESIA EVENT (EMERGENCY)
Dept: PERIOP | Facility: HOSPITAL | Age: 32
End: 2017-12-08
Payer: COMMERCIAL

## 2017-12-08 VITALS
HEART RATE: 84 BPM | DIASTOLIC BLOOD PRESSURE: 59 MMHG | RESPIRATION RATE: 16 BRPM | SYSTOLIC BLOOD PRESSURE: 113 MMHG | HEIGHT: 71 IN | TEMPERATURE: 98.2 F | WEIGHT: 170 LBS | OXYGEN SATURATION: 98 % | BODY MASS INDEX: 23.8 KG/M2

## 2017-12-08 LAB
ABO GROUP BLD: NORMAL
BASOPHILS # BLD AUTO: 0.04 THOUSANDS/ΜL (ref 0–0.1)
BASOPHILS NFR BLD AUTO: 1 % (ref 0–1)
BLD GP AB SCN SERPL QL: POSITIVE
EOSINOPHIL # BLD AUTO: 0.36 THOUSAND/ΜL (ref 0–0.61)
EOSINOPHIL NFR BLD AUTO: 5 % (ref 0–6)
ERYTHROCYTE [DISTWIDTH] IN BLOOD BY AUTOMATED COUNT: 11.9 % (ref 11.6–15.1)
HCT VFR BLD AUTO: 44.8 % (ref 34.8–46.1)
HGB BLD-MCNC: 15.4 G/DL (ref 11.5–15.4)
LYMPHOCYTES # BLD AUTO: 3.84 THOUSANDS/ΜL (ref 0.6–4.47)
LYMPHOCYTES NFR BLD AUTO: 51 % (ref 14–44)
MCH RBC QN AUTO: 33.8 PG (ref 26.8–34.3)
MCHC RBC AUTO-ENTMCNC: 34.4 G/DL (ref 31.4–37.4)
MCV RBC AUTO: 98 FL (ref 82–98)
MONOCYTES # BLD AUTO: 0.45 THOUSAND/ΜL (ref 0.17–1.22)
MONOCYTES NFR BLD AUTO: 6 % (ref 4–12)
NEUTROPHILS # BLD AUTO: 2.8 THOUSANDS/ΜL (ref 1.85–7.62)
NEUTS SEG NFR BLD AUTO: 37 % (ref 43–75)
NRBC BLD AUTO-RTO: 0 /100 WBCS
PLATELET # BLD AUTO: 294 THOUSANDS/UL (ref 149–390)
PMV BLD AUTO: 11 FL (ref 8.9–12.7)
RBC # BLD AUTO: 4.56 MILLION/UL (ref 3.81–5.12)
RH BLD: POSITIVE
SPECIMEN EXPIRATION DATE: NORMAL
WBC # BLD AUTO: 7.51 THOUSAND/UL (ref 4.31–10.16)

## 2017-12-08 PROCEDURE — 85025 COMPLETE CBC W/AUTO DIFF WBC: CPT | Performed by: EMERGENCY MEDICINE

## 2017-12-08 PROCEDURE — 86900 BLOOD TYPING SEROLOGIC ABO: CPT | Performed by: EMERGENCY MEDICINE

## 2017-12-08 PROCEDURE — 76856 US EXAM PELVIC COMPLETE: CPT

## 2017-12-08 PROCEDURE — 96360 HYDRATION IV INFUSION INIT: CPT

## 2017-12-08 PROCEDURE — 86850 RBC ANTIBODY SCREEN: CPT | Performed by: EMERGENCY MEDICINE

## 2017-12-08 PROCEDURE — 96361 HYDRATE IV INFUSION ADD-ON: CPT

## 2017-12-08 PROCEDURE — 36415 COLL VENOUS BLD VENIPUNCTURE: CPT | Performed by: EMERGENCY MEDICINE

## 2017-12-08 PROCEDURE — 86901 BLOOD TYPING SEROLOGIC RH(D): CPT | Performed by: EMERGENCY MEDICINE

## 2017-12-08 PROCEDURE — 88305 TISSUE EXAM BY PATHOLOGIST: CPT | Performed by: OBSTETRICS & GYNECOLOGY

## 2017-12-08 PROCEDURE — 99285 EMERGENCY DEPT VISIT HI MDM: CPT

## 2017-12-08 PROCEDURE — 76830 TRANSVAGINAL US NON-OB: CPT

## 2017-12-08 RX ORDER — FENTANYL CITRATE 50 UG/ML
INJECTION, SOLUTION INTRAMUSCULAR; INTRAVENOUS AS NEEDED
Status: DISCONTINUED | OUTPATIENT
Start: 2017-12-08 | End: 2017-12-08 | Stop reason: SURG

## 2017-12-08 RX ORDER — ONDANSETRON 2 MG/ML
4 INJECTION INTRAMUSCULAR; INTRAVENOUS ONCE AS NEEDED
Status: DISCONTINUED | OUTPATIENT
Start: 2017-12-08 | End: 2017-12-08 | Stop reason: HOSPADM

## 2017-12-08 RX ORDER — SODIUM CHLORIDE 9 MG/ML
125 INJECTION, SOLUTION INTRAVENOUS CONTINUOUS
Status: DISCONTINUED | OUTPATIENT
Start: 2017-12-08 | End: 2017-12-08 | Stop reason: HOSPADM

## 2017-12-08 RX ORDER — METHYLERGONOVINE MALEATE 0.2 MG/1
0.2 TABLET ORAL EVERY 6 HOURS SCHEDULED
Status: DISCONTINUED | OUTPATIENT
Start: 2017-12-08 | End: 2017-12-08 | Stop reason: HOSPADM

## 2017-12-08 RX ORDER — IBUPROFEN 400 MG/1
600 TABLET ORAL EVERY 6 HOURS PRN
Status: DISCONTINUED | OUTPATIENT
Start: 2017-12-08 | End: 2017-12-08 | Stop reason: HOSPADM

## 2017-12-08 RX ORDER — METHYLERGONOVINE MALEATE 0.2 MG/1
0.2 TABLET ORAL EVERY 6 HOURS SCHEDULED
Qty: 4 TABLET | Refills: 0 | Status: SHIPPED | OUTPATIENT
Start: 2017-12-08 | End: 2019-11-05

## 2017-12-08 RX ORDER — SODIUM CHLORIDE, SODIUM LACTATE, POTASSIUM CHLORIDE, CALCIUM CHLORIDE 600; 310; 30; 20 MG/100ML; MG/100ML; MG/100ML; MG/100ML
INJECTION, SOLUTION INTRAVENOUS CONTINUOUS PRN
Status: DISCONTINUED | OUTPATIENT
Start: 2017-12-08 | End: 2017-12-08 | Stop reason: SURG

## 2017-12-08 RX ORDER — OXYCODONE HYDROCHLORIDE AND ACETAMINOPHEN 5; 325 MG/1; MG/1
2 TABLET ORAL EVERY 4 HOURS PRN
Status: DISCONTINUED | OUTPATIENT
Start: 2017-12-08 | End: 2017-12-08 | Stop reason: HOSPADM

## 2017-12-08 RX ORDER — PROPOFOL 10 MG/ML
INJECTION, EMULSION INTRAVENOUS AS NEEDED
Status: DISCONTINUED | OUTPATIENT
Start: 2017-12-08 | End: 2017-12-08 | Stop reason: SURG

## 2017-12-08 RX ORDER — OXYTOCIN/RINGER'S LACTATE 30/500 ML
250 PLASTIC BAG, INJECTION (ML) INTRAVENOUS CONTINUOUS
Status: DISCONTINUED | OUTPATIENT
Start: 2017-12-08 | End: 2017-12-08

## 2017-12-08 RX ORDER — ALBUMIN, HUMAN INJ 5% 5 %
SOLUTION INTRAVENOUS CONTINUOUS PRN
Status: DISCONTINUED | OUTPATIENT
Start: 2017-12-08 | End: 2017-12-08 | Stop reason: SURG

## 2017-12-08 RX ORDER — ONDANSETRON 2 MG/ML
INJECTION INTRAMUSCULAR; INTRAVENOUS AS NEEDED
Status: DISCONTINUED | OUTPATIENT
Start: 2017-12-08 | End: 2017-12-08 | Stop reason: SURG

## 2017-12-08 RX ORDER — KETOROLAC TROMETHAMINE 30 MG/ML
30 INJECTION, SOLUTION INTRAMUSCULAR; INTRAVENOUS ONCE AS NEEDED
Status: DISCONTINUED | OUTPATIENT
Start: 2017-12-08 | End: 2017-12-08 | Stop reason: HOSPADM

## 2017-12-08 RX ORDER — SUCCINYLCHOLINE CHLORIDE 20 MG/ML
INJECTION INTRAMUSCULAR; INTRAVENOUS AS NEEDED
Status: DISCONTINUED | OUTPATIENT
Start: 2017-12-08 | End: 2017-12-08 | Stop reason: SURG

## 2017-12-08 RX ORDER — OXYCODONE HYDROCHLORIDE AND ACETAMINOPHEN 5; 325 MG/1; MG/1
1 TABLET ORAL EVERY 4 HOURS PRN
Status: DISCONTINUED | OUTPATIENT
Start: 2017-12-08 | End: 2017-12-08 | Stop reason: HOSPADM

## 2017-12-08 RX ORDER — MISOPROSTOL 200 UG/1
800 TABLET ORAL ONCE
Status: COMPLETED | OUTPATIENT
Start: 2017-12-08 | End: 2017-12-08

## 2017-12-08 RX ORDER — FENTANYL CITRATE/PF 50 MCG/ML
25 SYRINGE (ML) INJECTION
Status: DISCONTINUED | OUTPATIENT
Start: 2017-12-08 | End: 2017-12-08 | Stop reason: HOSPADM

## 2017-12-08 RX ORDER — LIDOCAINE HYDROCHLORIDE 10 MG/ML
INJECTION, SOLUTION INFILTRATION; PERINEURAL AS NEEDED
Status: DISCONTINUED | OUTPATIENT
Start: 2017-12-08 | End: 2017-12-08 | Stop reason: SURG

## 2017-12-08 RX ORDER — METHYLERGONOVINE MALEATE 0.2 MG/1
0.2 TABLET ORAL EVERY 6 HOURS SCHEDULED
Qty: 4 TABLET | Refills: 0 | Status: SHIPPED | OUTPATIENT
Start: 2017-12-08 | End: 2017-12-08

## 2017-12-08 RX ORDER — SODIUM CHLORIDE, SODIUM LACTATE, POTASSIUM CHLORIDE, CALCIUM CHLORIDE 600; 310; 30; 20 MG/100ML; MG/100ML; MG/100ML; MG/100ML
75 INJECTION, SOLUTION INTRAVENOUS CONTINUOUS
Status: DISCONTINUED | OUTPATIENT
Start: 2017-12-08 | End: 2017-12-08 | Stop reason: HOSPADM

## 2017-12-08 RX ADMIN — DEXAMETHASONE SODIUM PHOSPHATE 10 MG: 10 INJECTION INTRAMUSCULAR; INTRAVENOUS at 03:58

## 2017-12-08 RX ADMIN — Medication 250 MILLI-UNITS/MIN: at 04:08

## 2017-12-08 RX ADMIN — MISOPROSTOL 800 MCG: 200 TABLET ORAL at 01:30

## 2017-12-08 RX ADMIN — SUCCINYLCHOLINE CHLORIDE 80 MG: 20 INJECTION, SOLUTION INTRAMUSCULAR; INTRAVENOUS at 03:52

## 2017-12-08 RX ADMIN — SODIUM CHLORIDE 1000 ML: 0.9 INJECTION, SOLUTION INTRAVENOUS at 02:05

## 2017-12-08 RX ADMIN — ONDANSETRON 4 MG: 2 INJECTION INTRAMUSCULAR; INTRAVENOUS at 03:58

## 2017-12-08 RX ADMIN — METHYLERGONOVINE MALEATE 0.2 MG: 0.2 TABLET ORAL at 05:21

## 2017-12-08 RX ADMIN — SODIUM CHLORIDE 125 ML/HR: 0.9 INJECTION, SOLUTION INTRAVENOUS at 04:39

## 2017-12-08 RX ADMIN — LIDOCAINE HYDROCHLORIDE 50 MG: 10 INJECTION, SOLUTION INFILTRATION; PERINEURAL at 03:52

## 2017-12-08 RX ADMIN — SODIUM CHLORIDE, SODIUM LACTATE, POTASSIUM CHLORIDE, AND CALCIUM CHLORIDE: .6; .31; .03; .02 INJECTION, SOLUTION INTRAVENOUS at 03:44

## 2017-12-08 RX ADMIN — FENTANYL CITRATE 50 MCG: 50 INJECTION, SOLUTION INTRAMUSCULAR; INTRAVENOUS at 04:03

## 2017-12-08 RX ADMIN — ALBUMIN HUMAN: 0.05 INJECTION, SOLUTION INTRAVENOUS at 04:08

## 2017-12-08 RX ADMIN — PROPOFOL 200 MG: 10 INJECTION, EMULSION INTRAVENOUS at 03:52

## 2017-12-08 RX ADMIN — SODIUM CHLORIDE 1000 ML: 0.9 INJECTION, SOLUTION INTRAVENOUS at 00:27

## 2017-12-08 RX ADMIN — FENTANYL CITRATE 50 MCG: 50 INJECTION, SOLUTION INTRAMUSCULAR; INTRAVENOUS at 03:52

## 2017-12-08 RX ADMIN — SODIUM CHLORIDE, SODIUM LACTATE, POTASSIUM CHLORIDE, AND CALCIUM CHLORIDE 75 ML/HR: .6; .31; .03; .02 INJECTION, SOLUTION INTRAVENOUS at 05:21

## 2017-12-08 RX ADMIN — IBUPROFEN 600 MG: 400 TABLET, FILM COATED ORAL at 08:15

## 2017-12-08 NOTE — PLAN OF CARE
DISCHARGE PLANNING     Discharge to home or other facility with appropriate resources Progressing        HEMATOLOGIC - ADULT     Maintains hematologic stability Progressing        INFECTION - ADULT     Absence or prevention of progression during hospitalization Progressing     Absence of fever/infection during neutropenic period Progressing        Knowledge Deficit     Patient/family/caregiver demonstrates understanding of disease process, treatment plan, medications, and discharge instructions Progressing        PAIN - ADULT     Verbalizes/displays adequate comfort level or baseline comfort level Progressing        SAFETY ADULT     Patient will remain free of falls Progressing     Maintain or return to baseline ADL function Progressing     Maintain or return mobility status to optimal level Progressing

## 2017-12-08 NOTE — ED PROVIDER NOTES
History  Chief Complaint   Patient presents with    Vaginal Bleeding     patient c/o vaginal bleeding that started 1 hour ago  Pt 1 week postpartum  A 21-year-old female who is  at 9 days postpartum from a spontaneous vaginal delivery; presents with vaginal bleeding  Patient states she has been having the normal postpartum spotting however around 11:00 p m  this evening she developed sudden onset of heavy bleeding  Patient has past a significant amount of blood and multiple large blood clots  Patient states the bleeding has not slowed since onset  Patient does complain of lightheadedness, which was better when she laid down  Patient denies recent fever, chills, chest pain, shortness of breath, nausea, vomiting, diarrhea, abdominal pain, vaginal discharge, dysuria, peripheral edema and rashes  Patient states her pregnancy was uncomplicated  Patient reports immediately following her delivery her physician did have to remove multiple large blood clots manually, however the remainder of her postpartum hospitalization was uncomplicated  Patient states she has been doing well at home  Patient is breast-feeding  Assessment & plan:  Vaginal bleeding, patient is postpartum  On pelvic exam there is a moderate amount of blood within the vaginal vault  Abdominal exam benign  Will discuss with gynecology for ultrasound versus surgical intervention for likely retained products of conception  Will check hemoglobin for anemia will give IV fluids  History provided by:  Patient and medical records      Prior to Admission Medications   Prescriptions Last Dose Informant Patient Reported? Taking?    Prenatal Vit-Fe Fumarate-FA (PRENATAL 1+1 PO) 2017 at Unknown time  Yes Yes   Sig: Take 1 tablet by mouth daily   acetaminophen (TYLENOL) 325 mg tablet 2017 at Unknown time  No Yes   Sig: Take 2 tablets by mouth every 4 (four) hours as needed for headaches or fever   benzocaine-menthol-lanolin-aloe (DERMOPLAST) 20-0 5 % topical spray 12/8/2017 at Unknown time  No Yes   Sig: Apply 1 application topically 4 (four) times a day as needed for mild pain   docusate sodium (COLACE) 100 mg capsule 12/8/2017 at Unknown time  No Yes   Sig: Take 1 capsule by mouth 2 (two) times a day   hydrocortisone 1 % cream 12/8/2017 at Unknown time  No Yes   Sig: Apply 1 application topically as needed for irritation   ibuprofen (MOTRIN) 600 mg tablet 12/8/2017 at Unknown time  No Yes   Sig: Take 1 tablet by mouth every 6 (six) hours as needed for mild pain   witch hazel-glycerin (TUCKS) topical pad 12/8/2017 at Unknown time  No Yes   Sig: Apply 1 pad topically as needed for irritation      Facility-Administered Medications: None       Past Medical History:   Diagnosis Date    Female bladder prolapse 2014    Inguinal hernia 2014    Spina bifida ([facility] [address] ) 1102    Umbilical hernia 7481    Varicella     childhood       Past Surgical History:   Procedure Laterality Date    WISDOM TOOTH EXTRACTION  2000       Family History   Problem Relation Age of Onset    Hypertension Mother     Arthritis Father     Hypertension Father     Arthritis Maternal Grandmother     Diabetes Maternal Grandmother     Hypertension Maternal Grandmother     Arthritis Paternal Grandmother     Diabetes Paternal Grandmother     Hypertension Paternal Grandmother      I have reviewed and agree with the history as documented  Social History   Substance Use Topics    Smoking status: Never Smoker    Smokeless tobacco: Never Used    Alcohol use No        Review of Systems   Genitourinary: Positive for vaginal bleeding  Neurological: Positive for light-headedness  All other systems reviewed and are negative        Physical Exam  ED Triage Vitals [12/07/17 2352]   Temperature Pulse Respirations Blood Pressure SpO2   98 5 °F (36 9 °C) 77 18 145/81 99 %      Temp Source Heart Rate Source Patient Position - Orthostatic VS BP Location FiO2 (%)   Tympanic Monitor Sitting Left arm --      Pain Score       No Pain           Orthostatic Vital Signs  Vitals:    12/08/17 0445 12/08/17 0500 12/08/17 0745 12/08/17 0900   BP: 130/55 128/59 133/61 113/59   Pulse: 84 68 73 84   Patient Position - Orthostatic VS:  Lying Sitting Lying       Physical Exam   General Appearance: alert and oriented, nad, non toxic appearing  Skin:  Warm, dry, pale  HEENT: atraumatic, normocephalic  Neck: Supple, trachea midline  Cardiac: RRR; no murmurs, rub, gallops  Pulmonary: lungs CTAB; no wheezes, rales, rhonchi  Gastrointestinal: abdomen soft, nontender, nondistended; no guarding or rebound tenderness; good bowel sounds, no mass or bruits  Genitourinary: Pelvic exam chaperoned by Dr Jeremy Hurst  Moderate amount of blood noted in the vaginal vault  Unable to visualize the cervical os    Extremities:  no pedal edema, 2+ pulses; no calf tenderness, no clubbing, no cyanosis  Neuro:  no focal motor or sensory deficits, CN 2-12 grossly intact  Psych:  Normal mood and affect, normal judgement and insight      ED Medications  Medications   sodium chloride 0 9 % bolus 1,000 mL (0 mL Intravenous Stopped 12/8/17 0817)   misoprostol (CYTOTEC) tablet 800 mcg (800 mcg Rectal Given by Other 12/8/17 0130)   sodium chloride 0 9 % bolus 1,000 mL (0 mL Intravenous Stopped 12/8/17 0817)       Diagnostic Studies  Results Reviewed     Procedure Component Value Units Date/Time    CBC and differential [[id number]]  (Abnormal) Collected:  12/08/17 0025    Lab Status:  Final result Specimen:  Blood from Arm, Left Updated:  12/08/17 0035     WBC 7 51 Thousand/uL      RBC 4 56 Million/uL      Hemoglobin 15 4 g/dL      Hematocrit 44 8 %      MCV 98 fL      MCH 33 8 pg      MCHC 34 4 g/dL      RDW 11 9 %      MPV 11 0 fL      Platelets 703 Thousands/uL      nRBC 0 /100 WBCs      Neutrophils Relative 37 (L) %      Lymphocytes Relative 51 (H) %      Monocytes Relative 6 %      Eosinophils Relative 5 %      Basophils Relative 1 %      Neutrophils Absolute 2 80 Thousands/µL      Lymphocytes Absolute 3 84 Thousands/µL      Monocytes Absolute 0 45 Thousand/µL      Eosinophils Absolute 0 36 Thousand/µL      Basophils Absolute 0 04 Thousands/µL                  US pelvis complete w transvaginal   Final Result by Patti Morton MD (12/08 9114)       Prominent postpartum uterus  Thickened heterogeneous endometrium with vascular flow compatible with retained products of conception  Findings are consistent with the preliminary report from [facility] which was provided shortly after completion of the exam                              Workstation performed: [id number]               Procedures  Procedures      Phone Consults  ED Phone Contact    ED Course  ED Course as of Dec 08 1702   Fri Dec 08, 2017   0039 Hemoglobin: 15 4   0051 OB resident at bedside, concerned for amount of bleeding  Requesting pelvic US for further evaluation                                  MDM  CritCare Time    Disposition  Final diagnoses:   Retained products of conception, postpartum     Time reflects when diagnosis was documented in both MDM as applicable and the Disposition within this note     Time User Action Codes Description Comment    12/8/2017  2:41 AM Betzy Dacosta Add [O73 0] Retained products of conception, postpartum     12/8/2017  4:08 AM Troy Arellano Modify [O73 0] Retained products of conception, postpartum       ED Disposition     None      Follow-up Information     Follow up With Specialties Details Why Contact Info    Juwan Willett MD Obstetrics and Gynecology Schedule an appointment as soon as possible for a visit in 1 week(s)  [facility]  [facility] [address]  [address]          Discharge Medication List as of 12/8/2017  9:20 AM      START taking these medications    Details   methylergonovine (METHERGINE) 0 2 mg tablet Take 1 tablet by mouth every 6 (six) hours for 3 doses, Starting Fri 12/8/2017, Until Sat 12/9/2017, Print         CONTINUE these medications which have NOT CHANGED    Details   acetaminophen (TYLENOL) 325 mg tablet Take 2 tablets by mouth every 4 (four) hours as needed for headaches or fever, Starting Wed 11/29/2017, Print      benzocaine-menthol-lanolin-aloe (DERMOPLAST) 20-0 5 % topical spray Apply 1 application topically 4 (four) times a day as needed for mild pain, Starting Wed 11/29/2017, Print      docusate sodium (COLACE) 100 mg capsule Take 1 capsule by mouth 2 (two) times a day, Starting Wed 11/29/2017, Print      hydrocortisone 1 % cream Apply 1 application topically as needed for irritation, Starting Wed 11/29/2017, Print      ibuprofen (MOTRIN) 600 mg tablet Take 1 tablet by mouth every 6 (six) hours as needed for mild pain, Starting Wed 11/29/2017, Print      Prenatal Vit-Fe Fumarate-FA (PRENATAL 1+1 PO) Take 1 tablet by mouth daily, Historical Med      witch hazel-glycerin (TUCKS) topical pad Apply 1 pad topically as needed for irritation, Starting Wed 11/29/2017, Print           No discharge procedures on file  ED Provider  Attending physically available and evaluated Bi Vee  ETHAN managed the patient along with the ED Attending      Electronically Signed by         Monisha Hightower DO  Resident  12/08/17 9029

## 2017-12-08 NOTE — DISCHARGE INSTRUCTIONS
Dilation and Curettage   WHAT YOU SHOULD KNOW:   Dilation and curettage (D and C) is a procedure to remove tissue from the lining of your uterus  AFTER YOU LEAVE:   Medicines:   · Pain medicine: You may be given medicine to take away or decrease pain  Do not wait until the pain is severe before you take your medicine  · Antibiotics: This medicine will help fight or prevent an infection  · Take your medicine as directed  Call your healthcare provider if you think your medicine is not helping or if you have side effects  Tell him if you are allergic to any medicine  Keep a list of the medicines, vitamins, and herbs you take  Include the amounts, and when and why you take them  Bring the list or the pill bottles to follow-up visits  Carry your medicine list with you in case of an emergency  Follow up with your healthcare provider as directed:  Write down your questions so you remember to ask them during your visits  Activity:  Ask your primary healthcare provider when you can return to your normal activities  Contact your primary healthcare provider if:   · You have foul-smelling vaginal discharge  · You do not get your monthly period  · You feel depressed or anxious  · You feel very tired and weak  · You have questions or concerns about your condition or care  Seek care immediately or call 911 if:   · You have heavy vaginal bleeding that soaks 1 pad in 1 hour for 2 hours in a row  · You have a fever and abdominal cramps  · Your pain does not get better, even after treatment  © 2014 3802 [employer] is for End User's use only and may not be sold, redistributed or otherwise used for commercial purposes  All illustrations and images included in CareNotes® are the copyrighted property of [employer] , [employer]  or Adithya Souza  The above information is an  only  It is not intended as medical advice for individual conditions or treatments  Talk to your doctor, nurse or pharmacist before following any medical regimen to see if it is safe and effective for you

## 2017-12-08 NOTE — OP NOTE
OPERATIVE REPORT  PATIENT NAME: Ben Blackburn    :  1985  MRN: [id number]  Pt Location:  OR ROOM 10    SURGERY DATE: 2017    Surgeon(s) and Role:     * Fanta Santana MD - Primary     * Blanca Rea MD - Assisting    Preop Diagnosis:  Retained products of conception, postpartum [O73 0]    Post-Op Diagnosis Codes:     * Retained products of conception, postpartum [O73 0]    Procedure(s) (LRB):  DILATATION AND EVACUATION (D&E) (N/A)    Specimen(s):  ID Type Source Tests Collected by Time Destination   1 : PRODUCTS OF CONCEPTION Tissue Products of Conception TISSUE EXAM Fanta Santana MD 2017 4615        Estimated Blood Loss:   250mL    Anesthesia Type:   Choice    Operative Indications:  Retained products of conception, postpartum [O73 0]    Operative Findings:  1  Diffusely boggy uterus, fundus located at the level of the umbilicus  2  Moderate vaginal bleeding that persists in OR  3  Intact perineum and no appreciable lacerations of the vagina or labia    Complications:   None    Procedure and Technique:  Brief History  Ms Abram Ochoa is a 34 y o  [id number] PPD#10 s/p  who presents with vaginal bleeding  Ultrasound evaluation was notable for thickened, heterogenous endometrium concerning for retained products of conception  She was consented for surgical management via suction dilation and evacuation  All risks, benefits, and alternatives to the procedure were discussed with the patient and she had the opportunity to ask questions  Informed consent was obtained  Description of Procedure    Patient was taken to the operating room were a time out was performed to confirm correct patient and correct procedure  General endotracheal anesthesia (GET) was administered and the patient was positioned on the OR table in the dorsal lithotomy position  All pressure points were padded and a alan hugger was placed to maintain control of core body temperature   A bimanual exam was performed and the uterus was noted to be diffusely enlarged and boggy  The patient was prepped and draped in the usual sterile fashion  Operative Technique    A straight catheter was introduced into the bladder, which was drained of clear yellow urine  A weighted speculum was inserted into the vagina and a Cruz retractor was used to visualize the anterior lip of the cervix, which was then grasped with a single toothed tenaculum  The patient had received cytotec in the ED to help facilitate passage of retained tissue  As such, the cervix was visually dilated 3cm and cervical dilators passed without resistance  A size 10 curved suction tipped curette was selected for this procedure  Suction tipped curette was inserted into the uterus to the fundus  Suction was applied and curette was rotated and slowly withdrawn from the uterus, taking care to release suction at the level of the cervix to prevent cervical trauma  This was repeated for a total of three passes, obtaining products of conception with each pass  Sharp curetting was then performed, starting at the 12'oclock position and rotating a total of 360 degrees to cover all surfaces  The uterus did not contract following this and there was palpable tissue remaining within the uterus  Suction curettage was repeated in similar fashion for three additional passes, again obtaining products of conception with each pass  The uterus was notably better contracted following this attempt  Sharp curetting was repeated and no additional tissue was palpable  Suction curettage was repeated one final time to evacuate remaining clot and debris  The single toothed tenaculum was removed from the anterior lip of the cervix  Good hemostasis was confirmed at the tenaculum puncture sites  Weighted speculum was then removed from the vagina  Bimanual massage was performed and the uterus was contracted to approximately 14wk size and firm  Pitocin was administered to maintain uterine tone       At the conclusion of the procedure, all needle, sponge, and instrument counts were noted to be correct  Patient tolerated the procedure well and was transferred to PACU in stable condition prior to discharge with follow up in 1-2 weeks  Dr Renate Dudley was present and participated in all key portions of the case      Patient Disposition:  PACU  and hemodynamically stable    SIGNATURE: Rachel Walls MD  DATE: December 8, 2017  TIME: 4:36 AM

## 2017-12-08 NOTE — H&P
Please refer to consultation note as H&P  Decision made to proceed with with surgical management following initial assessment  Ultrasound evaluation notable for a 5cm endometrial stripe with heterogenous material concerning for retained products of conception  Patient's bleeding remains moderate despite administration of cytotec  Discussed findings with patient, including recommendation to proceed with suction D&C for evacuation of retained products of conception  Patient agreeable to same      Dr Manuel feliciano and Breonna Cash MD   12/08/17  2:44 AM

## 2017-12-08 NOTE — ANESTHESIA PREPROCEDURE EVALUATION
Review of Systems/Medical History    Chart reviewed  No history of anesthetic complications     Cardiovascular  Negative cardio ROS Exercise tolerance: good,     Pulmonary  Negative pulmonary ROS ,        GI/Hepatic  Negative GI/hepatic ROS          Negative  ROS        Endo/Other  Negative endo/other ROS      GYN      Comment: About 1 week postpartum  Pt is breastfeeding     Hematology  Negative hematology ROS      Musculoskeletal       Neurology      Comment: Spina bifida occulta; no sequelae, pt didn't know she had it, found incidentally Psychology           Physical Exam    Airway    Mallampati score: I  TM Distance: >3 FB  Neck ROM: full     Dental   No notable dental hx     Cardiovascular  Comment: Negative ROS,     Pulmonary      Other Findings        Anesthesia Plan  ASA Score- 2       Anesthesia Type- general with ASA Monitors  Additional Monitors:   Airway Plan: ETT  Induction- intravenous and rapid sequence induction  Informed Consent- Anesthetic plan and risks discussed with patient  I personally reviewed this patient with the CRNA  Discussed and agreed on the Anesthesia Plan with the CRNA  Kym Blake

## 2017-12-08 NOTE — ED NOTES
OB paged and aware that cytotec is up from pharmacy to administer        Omar Briceno, RN  12/08/17 1506

## 2017-12-08 NOTE — PLAN OF CARE
DISCHARGE PLANNING     Discharge to home or other facility with appropriate resources Completed        HEMATOLOGIC - ADULT     Maintains hematologic stability Completed        INFECTION - ADULT     Absence or prevention of progression during hospitalization Completed     Absence of fever/infection during neutropenic period Completed        Knowledge Deficit     Patient/family/caregiver demonstrates understanding of disease process, treatment plan, medications, and discharge instructions Completed        PAIN - ADULT     Verbalizes/displays adequate comfort level or baseline comfort level Completed        SAFETY ADULT     Patient will remain free of falls Completed     Maintain or return to baseline ADL function Completed     Maintain or return mobility status to optimal level Completed

## 2017-12-08 NOTE — ED ATTENDING ATTESTATION
I, [address] [place] [address], DO, saw and evaluated the patient  I have discussed the patient with the resident/non-physician practitioner and agree with the resident's/non-physician practitioner's findings, Plan of Care, and MDM as documented in the resident's/non-physician practitioner's note, except where noted  All available labs and Radiology studies were reviewed  At this point I agree with the current assessment done in the Emergency Department  I have conducted an independent evaluation of this patient a history and physical is as follows:    29yo female presents with vaginal bleeding  Pt is  and is 9 weeks post partum  At 11pm started with a gush of blood and clots  Normal delivery, spontaneous vaginal without complication  No abd pain, admits to some dizziness  On exam - nad, heart reg, no resp distress, blood noted in vaginal vault    Plan - d/w OB, check cbc and type and screen    Critical Care Time  CritCare Time

## 2017-12-08 NOTE — CONSULTS
Consult - OB/GYN   Rajan Meredith Boothe 34 y o  female MRN: [id number]  Unit/Bed#: ED 11 Encounter: [id number]      Chief complaint:  Vaginal bleeding    HPI:  Ms Coretta Kraus is a 34 y o  [id number] PPD#10 s/p   She presents today with sudden increase in her lochia  Patient notes that her bleeding in the post-partum period had felt overall less than average  She typically used large maxi pads after her last delivery, but this time she only needed regular pads  She said her bleeding was tapering down until tonight when she had a large gush of blood  Saturated a towel on the way in  Feels slightly light headed but is unsure if this is nervousness  Denies cp, palpitations, sob, n/v/d  Patient's delivery course was complicated by rapid delivery  She was discharged on PPD#1 without complications      Pregnancy Complications:  Patient Active Problem List   Diagnosis     (spontaneous vaginal delivery)    38 weeks gestation of pregnancy       PMH:  Past Medical History:   Diagnosis Date    Female bladder prolapse 2014    Inguinal hernia 2014    [facility]    Umbilical hernia 9507    Varicella     childhood       PSH:  Past Surgical History:   Procedure Laterality Date    WISDOM TOOTH EXTRACTION         Social Hx:  Denies tobacco, drugs, EtOH    OB Hx:  Obstetric History       T2      L2     SAB0   TAB0   Ectopic0   Multiple0   Live Births2       # Outcome Date GA Lbr Gene/2nd Weight Sex Delivery Anes PTL Lv   2 Term 17 38w3d / 00:17 3945 g (8 lb 11 2 oz) F Vag-Spont None N LIZZETTE      Name: Ike Double:  9                Apgar5: 9   1 Term 14 40w1d   M Vag-Spont None N LIZZETTE      Complications: Female bladder prolapse          Meds:  No current facility-administered medications on file prior to encounter        Current Outpatient Prescriptions on File Prior to Encounter   Medication Sig Dispense Refill    acetaminophen (TYLENOL) 325 mg tablet Take 2 tablets by mouth every 4 (four) hours as needed for headaches or fever 30 tablet 0    benzocaine-menthol-lanolin-aloe (DERMOPLAST) 20-0 5 % topical spray Apply 1 application topically 4 (four) times a day as needed for mild pain 1 each 0    docusate sodium (COLACE) 100 mg capsule Take 1 capsule by mouth 2 (two) times a day 10 capsule 0    hydrocortisone 1 % cream Apply 1 application topically as needed for irritation 30 g 0    ibuprofen (MOTRIN) 600 mg tablet Take 1 tablet by mouth every 6 (six) hours as needed for mild pain 30 tablet 0    Prenatal Vit-Fe Fumarate-FA (PRENATAL 1+1 PO) Take 1 tablet by mouth daily      witch hazel-glycerin (TUCKS) topical pad Apply 1 pad topically as needed for irritation 40 each 0       Allergies:  No Known Allergies      Physical Exam:  /81   Pulse 77   Temp 98 5 °F (36 9 °C) (Tympanic)   Resp 18   Ht 5' 11" (1 803 m)   Wt 77 1 kg (170 lb)   LMP 03/04/2017   SpO2 99%   BMI 23 71 kg/m²     Physical Exam   Constitutional: She appears well-developed and well-nourished  No distress  Eyes: No scleral icterus  Cardiovascular: Normal rate, regular rhythm and normal heart sounds  Exam reveals no gallop and no friction rub  No murmur heard  Pulmonary/Chest: Effort normal and breath sounds normal  No respiratory distress  She has no wheezes  She has no rales  Abdominal: Soft  She exhibits no distension  There is no tenderness  There is no rebound and no guarding  Uterine fundus non-tender, but soft and located at level of umbilicus    Genitourinary: There is no rash, tenderness or lesion on the right labia  There is no rash, tenderness or lesion on the left labia  Uterus is enlarged (soft lower uterine segment, clots palpable in JEANNIE but unable to advance more than 1 finger into cervix to evaluate further)  Uterus is not tender  Cervix exhibits no motion tenderness (palpably 1cm dilated cervix)   There is bleeding (moderate vagial bleeding, bright red blood with clot) in the vagina  No signs of injury (intact perineum, no lacerations noted) around the vagina  Musculoskeletal: She exhibits no edema or tenderness (LE non-tender to palpation bilaterally)  Skin: Skin is warm and dry  No rash noted  No erythema  No pallor  Psychiatric: She has a normal mood and affect  Her behavior is normal            Assessment:   34 y o  [id number] PPD#10 s/p rapid SVE with vaginal bleeding  Exam concerning for retained products of conception  Hemodynamically stable at present, but moderate vaginal bleeding noted with soft uterus  Plan:   1   Concern for retained products of conception  - CBC  - Pelvic ultrasound  - Cytotec 800mcg CO  - Monitor bleeding    Discussed with Dr Jah Stephen

## 2017-12-14 ENCOUNTER — GENERIC CONVERSION - ENCOUNTER (OUTPATIENT)
Dept: OTHER | Facility: OTHER | Age: 32
End: 2017-12-14

## 2018-01-10 ENCOUNTER — GENERIC CONVERSION - ENCOUNTER (OUTPATIENT)
Dept: OTHER | Facility: OTHER | Age: 33
End: 2018-01-10

## 2018-01-12 VITALS
HEIGHT: 71 IN | SYSTOLIC BLOOD PRESSURE: 128 MMHG | BODY MASS INDEX: 27.44 KG/M2 | WEIGHT: 196 LBS | DIASTOLIC BLOOD PRESSURE: 66 MMHG

## 2018-01-12 VITALS
HEIGHT: 71 IN | SYSTOLIC BLOOD PRESSURE: 128 MMHG | WEIGHT: 163 LBS | BODY MASS INDEX: 22.82 KG/M2 | DIASTOLIC BLOOD PRESSURE: 62 MMHG

## 2018-01-12 VITALS
DIASTOLIC BLOOD PRESSURE: 64 MMHG | BODY MASS INDEX: 27.59 KG/M2 | WEIGHT: 197.05 LBS | SYSTOLIC BLOOD PRESSURE: 123 MMHG | HEIGHT: 71 IN

## 2018-01-12 VITALS
BODY MASS INDEX: 24.16 KG/M2 | SYSTOLIC BLOOD PRESSURE: 118 MMHG | HEIGHT: 71 IN | WEIGHT: 172.6 LBS | DIASTOLIC BLOOD PRESSURE: 78 MMHG

## 2018-01-12 LAB
ADEQUACY: (HISTORICAL): NORMAL
CLINICIAN PROVIDIED ICD 9 OR 10 (HISTORICAL): NORMAL
COMMENT (HISTORICAL): NORMAL
DIAGNOSIS (HISTORICAL): NORMAL
PERFORMED BY (HISTORICAL): NORMAL
REFLEX (HISTORICAL): NORMAL

## 2018-01-12 NOTE — PROGRESS NOTES
Chief Complaint  Patient here for OB intake visit  History of Present Illness  HPI: Patient is a 28 yo  with an [address] of 2017 by LMP  Initial U/S agreed with dates  She is 9w 6d today  C/o manageable nausea  First pregnancy in  resulted in a  at 40 weeks after 4 hours of labor with no anesthesia  Patient had asymptomatic intermittent HTN the last 2 weeks of her pregnancy and was GBS positive  Son was 8lb 10 oz  No complications  Patient was diagnosed incidentally with spina bifida occulta by a chiropractor at age 20  She also has an umbilical and an inguinal hernia, both unrepaired  Hernias run in her family; sisters and PGF have them  First pregnancy left her with a mild bladder prolapse and diastasis recti  Also has varicosities in her R leg  Had her wisdom teeth removed; no complications from anesthesia  Taking PNV and folic acid; refills sent to pharmacy  Denies latex, medication, food, and seasonal allergies  Was having 1 drink 3-4 times a week prior to pregnancy, but none since  Patient's mother is from [facility] and has iron deficiency anemia  Also has HTN, hypothyroidism, seasonal allergies, and anxiety  Her father has HTN  Her PGF  of an MI  MGM has diabetes and skin CA  Family history otherwise negative  Aside from patient's spina bifida occulta, genetic history for she and her  otherwise negative  Patient's  had HPV warts removed; they have not returned  She had varicella as a child; MRSA history negative  Recommended weight gain of 25-35 lbs; discussed diet and exercise for pregnancy  No cats or birds  Patient works as an outpatient physical therapist  Stressed good hand hygiene  Take breaks when needed and no lifting more than 5-10 lbs  She and her  are travelling to [place] and [address] in   Discussed flying in pregnancy  Recommended patient to stay well-hydrated and ambulate every 1-2 hours  Wash hands frequently while away, and be careful what she eats  Also recommended travel health insurance in case of emergency  Zika precautions given  Had flu vaccine fall   Slip for  [facility] PN labs given  Patient plans to breastfeed  Has appt for NT scan and genetic counseling at [facility] on   Went over [employer]; all info given, and consent form signed  Initial OB appt on   Time spent was 60 minutes  Active Problems    1  Encounter for supervision of normal pregnancy (V22 1) (Z34 90)    Allergies    1  No Known Drug Allergies    Assessment    1  Encounter for supervision of normal pregnancy (V22 1) (Z34 90)    Plan  Encounter for supervision of normal pregnancy    · Folic Acid 1 MG Oral Tablet; TAKE 1 TABLET DAILY AS DIRECTED   · PrePLUS 27-1 MG Oral Tablet; Take 1 tablet daily   · ([facility]) HIV 1/O/2 Antigen/Antibody (Fourth Generation) Preliminary Test with [facility]ascade  Reflex to Supplementary Testing; Status:Active; Requested for:31Rwd1123;    · ( [facility]) Prenatal Profile I; Status:Active; Requested for:2017;    · ( [facility]) UA/M w/rflx Culture, Routine; Status:Active;  Requested for:2017;    · *1 - [address] and Ultrasound Only    NT scan & genetic counselor  Status: Hold For - Scheduling  Requested for: [id number]  Care Summary provided  : Yes    Future Appointments    Date/Time Provider Specialty Site   2017 11:30 AM Anton Fernández [facility] Obstetrics/Gynecology [address] [place] [address] OB/GYN   2017 09:00 AM  [place], [address]   Electronically signed by : Aliya Humphries [facility]; May 12 2017 12:10PM EST                       (Author)    Electronically signed by : SARAY Andrews ; May 18 2017  3:21PM EST                       (Author)

## 2018-01-13 VITALS
SYSTOLIC BLOOD PRESSURE: 116 MMHG | HEIGHT: 71 IN | DIASTOLIC BLOOD PRESSURE: 72 MMHG | WEIGHT: 188 LBS | BODY MASS INDEX: 26.32 KG/M2

## 2018-01-13 VITALS
TEMPERATURE: 96.8 F | SYSTOLIC BLOOD PRESSURE: 108 MMHG | BODY MASS INDEX: 23.57 KG/M2 | HEIGHT: 71 IN | RESPIRATION RATE: 12 BRPM | HEART RATE: 76 BPM | DIASTOLIC BLOOD PRESSURE: 64 MMHG | WEIGHT: 168.38 LBS

## 2018-01-14 VITALS
SYSTOLIC BLOOD PRESSURE: 126 MMHG | WEIGHT: 181.25 LBS | HEIGHT: 71 IN | DIASTOLIC BLOOD PRESSURE: 68 MMHG | BODY MASS INDEX: 25.37 KG/M2

## 2018-01-14 VITALS
DIASTOLIC BLOOD PRESSURE: 78 MMHG | SYSTOLIC BLOOD PRESSURE: 140 MMHG | HEIGHT: 71 IN | BODY MASS INDEX: 22.72 KG/M2 | WEIGHT: 162.25 LBS

## 2018-01-15 VITALS
BODY MASS INDEX: 26.32 KG/M2 | HEART RATE: 80 BPM | DIASTOLIC BLOOD PRESSURE: 64 MMHG | SYSTOLIC BLOOD PRESSURE: 122 MMHG | WEIGHT: 188 LBS | HEIGHT: 71 IN | RESPIRATION RATE: 14 BRPM

## 2018-01-15 NOTE — PROGRESS NOTES
2017         RE: Kana Fan                                To: SARAY Ritter    MR#: [id number]                                  [facility]   : NOV 3 1985                                   [address] [place] [address] Road: [id number]:TERE Harper [address]   (Exam #: [id number])                           Fax: [phone]      The LMP of this 27year old,  G2, P1-0-0-1 patient was MAR 4 2017, giving   her an LYN of DEC 9 2017 and a current gestational age of 16 weeks 6 days   by dates  A sonographic examination was performed on 2017 using real   time equipment  The ultrasound examination was performed using abdominal   technique  The patient has a BMI of 22 7  Her blood pressure today was   128/62  Per the patient, an ultrasound was performed in the OBGYN's office at   approx 8 weeks gestation that correlated with her EDC by LMP  Earliest   ultrasound found in her record: MFM US 17 13w3d 17 LYN      Karen Govea is on prenatal vitamins and folic acid and denies any allergies to   medication or any use of cigarettes, alcohol or illicit drugs  She denies   any significant past medical or surgical history other than that she was   diagnosed with spina bifida occulta in the past  She denies any other   first generation family history of hypertension, diabetes, thrombosis or   congenital anomalies  She's had a prior vaginal delivery without   complication in  with a baby that weighed 8 lbs  10 oz  She is here   today for genetic screening  She met with genetics today to review her   risks for spina bifida occulta found in her pregnancy  She was told her   risk is similar to the normal population based risk  Please see her   genetic counselor report  Multiple longitudinal and transverse sections revealed a davis   intrauterine pregnancy with the fetus in variable presentation   The   placenta is posterior in implantation, grade 0 in appearance  Cardiac motion was observed at 159 bpm       INDICATIONS      first trimester genetic screening   maternal spina bifida occulta      Exam Types      Level I      RESULTS      Fetus # 1 of 1   Variable presentation      MEASUREMENTS (* Included In Average GA)      CRL              7 5 cm        13 weeks 3 days*   Nuchal Trans    1 10 mm      THE AVERAGE GESTATIONAL AGE is 13 weeks 3 days +/- 7 days  ANATOMY COMMENTS      Anatomic detail is limited at this gestational age  The yolk sac was not   noted  The fetal cranium appeared normal in shape  The nuchal   translucency was normal at 1 1mm  The nasal bone appears normal  The   intracranial anatomy was unremarkable  Evaluation of the spine revealed   no obvious evidence for a neural tube defect  Anatomy of the fetal thorax   appeared within normal limits  The cardiac rhythm was regular  Within the   abdomen, stomach & bladder were visualized and the abdominal wall appeared   intact  A three vessel cord appears to be present  Active movement of the   fetal body & extremities was seen  There is no suspicion of a   subchorionic bleed  The placental cord insertion was normal    There is   no suspicion of a uterine myoma  Free fluid is not seen in the posterior   cul-de-sac  ADNEXA      The left ovary appeared normal and measured 2 1 x 2 7 x 1 0 cm with a   volume of 3 0 cc  The right ovary appeared normal and measured 2 2 x 1 5 x   1 6 cm with a volume of 2 8 cc        AMNIOTIC FLUID         Largest Vertical Pocket = 4 4 cm   Amniotic Fluid: Normal      IMPRESSION      Downey IUP   13 weeks and 3 days by this ultrasound  (LYN=DEC 5 2017)   Variable presentation   Regular fetal heart rate of 159 bpm   Posterior placenta      RECOMMENDATION      Fetal Anatomic Survey: at 20 weeks      GENERAL COMMENT      OFFICE CONSULT      As per your request, a consultation was performed on your patient for the   following indication: sequential screen      The patient was informed of the findings and counseled about the   limitations of the exam in detecting all forms of fetal congenital   abnormalities  She denies any vaginal bleeding or uterine cramping/contractions  Exam shows she is comfortable and her abdomen is non tender  Today's ultrasound findings and suggested follow-up were discussed in   detail with the patient  The Sequential Screen was discussed in detail,   including the sensitivities for detection of Down syndrome, Trisomy 18,   and open neural tube defects  The patient underwent fingerstick blood   collection for hCG and JANELLE-A to complete the initial component of the   Sequential Screen  Results should be available within one week  Follow up recommended:   1  Follow-up multiple marker serum screening at 16-18 weeks' gestation is   recommended to complete the Sequential Screen  2  Fetal Level II ultrasound imaging is recommended at 19-20 weeks'   gestation  CASSIE Nunez M D     Maternal-Fetal Medicine   Electronically signed 06/02/17 20:12

## 2018-01-16 NOTE — MISCELLANEOUS
Message  Spoke to patient-- informed her of slightly elevated 1 hr GTT of 138  Explained need for 3 hour GTT to rule out GDM  Patient understands and agrees-- order placed; she will go to [facility]        Signatures   Electronically signed by : Mechelle Chavarria NP; Sep 19 2017 11:04AM EST                       (Author)

## 2018-01-16 NOTE — RESULT NOTES
Verified Results  (1) SEQUENTIAL SCREEN 2 26Jun2017 07:39AM Schucker, Anastasio Buerger     Test Name Result Flag Reference   SCAN RESULT      Results available in the [facility], [facility]

## 2018-01-18 NOTE — PROGRESS NOTES
2017         RE: Chevy Rea                                To: SARAY Joseph    MR#: [id number]                                  [facility]   : NOV 3 1985                                   [address] [place] [address] Road: [id number]:[address]   (Exam #: [id number])                           Fax: [phone]      The LMP of this 27year old,  G2, P1-0-0-1 patient was MAR 4 2017, giving   her an LYN of DEC 9 2017 and a current gestational age of 25 weeks 3 days   by dates  A sonographic examination was performed on 2017 using   real time equipment  The ultrasound examination was performed using   abdominal & vaginal techniques  The patient has a BMI of 24 0  Her initial   blood pressure today was 157/67, and it was later recorded at 118/72  Per the patient, an ultrasound was performed in the OBGYN's office at   approx 8 weeks gestation that correlated with her EDC by LMP  Earliest   ultrasound found in her record: MFM US 17 13w3d 17 LYN         Debbie has no complaints today  She reports fetal movements and denies   vaginal bleeding  Her recently completed Sequential Screen revealed a   Down syndrome risk of 1:340, Trisomy 18 risk of 1:10,000, and ONTD risk of   1:820  Evaluation of the individual analyte levels reveals no increased   risk for abnormal placentation        Cardiac motion was observed at 144 bpm       INDICATIONS      fetal anatomical survey      Exam Types      LEVEL II   Transvaginal      RESULTS      Fetus # 1 of 1   Vertex presentation   Fetal growth appeared normal   Placenta Location = Posterior   No placenta previa   Placenta Grade = I      MEASUREMENTS (* Included In Average GA)      AC              15 7 cm        20 weeks 4 days* (54%)   BPD              5 0 cm        21 weeks 1 day * (69%)   HC              19 3 cm        21 weeks 3 days* (74%)   Femur            3 3 cm 20 weeks 4 days* (45%)      Nuchal Fold      3 6 mm   NBL              6 2 mm      Humerus          3 4 cm        21 weeks 4 days  (76%)      Cerebellum       2 2 cm        21 weeks 6 days   Biorbit          3 5 cm        22 weeks 3 days   CisternaMagna    3 6 mm      HC/AC           1 23   FL/AC           0 21   FL/BPD          0 66   EFW (Ac/Fl/Hc)   375 grams - 0 lbs 13 oz      THE AVERAGE GESTATIONAL AGE is 21 weeks 0 days +/- 10 days  AMNIOTIC FLUID         Largest Vertical Pocket = 4 2 cm   Amniotic Fluid: Normal      CERVICAL EVALUATION      The cervix appeared normal (Ultrasound Examination)  SUPINE      Cervical Length: 3 90 cm      OTHER TEST RESULTS           Funneling?: No           Resp  To Standing?: No     Dynamic Changes?: No                Resp  To TFP?: No              Debris?: No      ANATOMY      Head                                    Normal   Face/Neck                               Normal   Th  Cav  Normal   Heart                                   Normal   Abd  Cav  Normal   Stomach                                 Normal   Right Kidney                            Normal   Left Kidney                             Normal   Bladder                                 Normal   Abd  Wall                               Normal   Spine                                   Normal   Extrems                                 Normal   Genitalia                               Normal   Placenta                                Normal   Umbl  Cord                              Normal   Uterus                                  Normal   PCI                                     Normal      ANATOMY DETAILS      Visualized Appearing Sonographically Normal:   HEAD: (Calvarium, BPD Level, Cavum, Lateral Ventricles, Choroid Plexus,   Cerebellum, Cisterna Magna);    FACE/NECK: (Neck, Nuchal Fold, Profile,   Orbits, Nose/Lips, Palate, Face);    TH  CAV  : (Lungs, Diaphragm); HEART: (Four Chamber View, Proximal Left Outflow, Proximal Right Outflow,   3VV, 3 Vessel Trachea, Short Axis of Greater Vessels, Ductal Arch, Aortic   Arch, Interventricular Septum, Interatrial Septum, IVC, SVC, Cardiac Axis,   Cardiac Position);    ABD  CAV : (Liver);    STOMACH, RIGHT KIDNEY, LEFT   KIDNEY, BLADDER, ABD  WALL, SPINE: (Cervical Spine, Thoracic Spine, Lumbar   Spine, Sacrum);    EXTREMS: (Lt Humerus, Rt Humerus, Lt Forearm, Rt   Forearm, Lt Hand, Rt Hand, Lt Femur, Rt Femur, Lt Low Leg, Rt Low Leg, Lt   Foot, Rt Foot);    GENITALIA (Female), PLACENTA, UMBL  CORD, UTERUS, PCI      ADNEXA      The left ovary appeared normal and measured 2 0 x 1 9 x 0 9 cm with a   volume of 1 8 cc  The right ovary appeared normal and measured 2 4 x 1 8 x   1 5 cm with a volume of 3 4 cc  IMPRESSION      Downey IUP   21 weeks and 0 days by this ultrasound  (LYN=DEC 5 2017)   Vertex presentation   Fetal growth appeared normal   Normal anatomy survey   Regular fetal heart rate of 144 bpm   Posterior placenta   No placenta previa      GENERAL COMMENT      No fetal structural abnormality or ultrasound marker for aneuploidy is   identified on the Level II ultrasound study today  Fetal growth and   amniotic fluid volume are normal   The placenta is normal in appearance  The cervix is normal in appearance by transvaginal sonography  The   cervical length is normal   Cervical debris is not present  Cervical   funneling is not present  Neither provocative nor dynamic change is   appreciated  Today's ultrasound findings and suggested follow-up were discussed in   detail with Mickey Sanders  We discussed that prenatal ultrasound cannot rule out   all congenital abnormalities  Her Sequential Screen results were discussed   in detail  Jaylaanuradha Hendricksonmer will return to the [address] [place] [address] at about 32 weeks   gestation to assess interval growth        The face to face time, in addition to time spent discussing ultrasound   results, was approximately 10 minutes, greater than 50% of which was spent   during counseling and coordination of care  CASSIE White M D     Maternal-Fetal Medicine   Electronically signed 07/30/17 09:07

## 2018-01-22 VITALS
SYSTOLIC BLOOD PRESSURE: 118 MMHG | DIASTOLIC BLOOD PRESSURE: 66 MMHG | HEIGHT: 71 IN | WEIGHT: 162 LBS | BODY MASS INDEX: 22.68 KG/M2

## 2018-01-22 VITALS
WEIGHT: 200.25 LBS | HEIGHT: 71 IN | SYSTOLIC BLOOD PRESSURE: 120 MMHG | BODY MASS INDEX: 28.03 KG/M2 | DIASTOLIC BLOOD PRESSURE: 70 MMHG

## 2018-01-22 VITALS
WEIGHT: 201.5 LBS | DIASTOLIC BLOOD PRESSURE: 70 MMHG | BODY MASS INDEX: 28.21 KG/M2 | SYSTOLIC BLOOD PRESSURE: 118 MMHG | HEIGHT: 71 IN

## 2018-01-22 VITALS
WEIGHT: 175.5 LBS | BODY MASS INDEX: 24.57 KG/M2 | DIASTOLIC BLOOD PRESSURE: 60 MMHG | HEIGHT: 71 IN | SYSTOLIC BLOOD PRESSURE: 112 MMHG

## 2018-01-23 NOTE — MISCELLANEOUS
Chief Complaint  Chief Complaint Free Text Note Form: LMOMFCB TO [address] 17 HB  LMOMFCB TO SCHED 2017 HORACIO      History of Present Illness  TCM Communication [facility]: The patient is being contacted for follow-up after hospitalization  Hospital records were reviewed  She was hospitalized at  The date of admission: 17, date of discharge: 17  Diagnosis: O80  She was discharged to home  She refused a follow up appointment due to  Lily Tinajero  Communication performed and completed by KT 2017      Active Problems    1  Bladder prolapse, female, acquired (618 01) (N81 10)   2  Breast feeding status of mother (V24 1) (Z39 1)   3  Encounter for fetal anatomic survey (V28 81) (Z36 89)   4  Encounter for screening for risk of pre-term labor (V28 82) (Z36 86)   5  Encounter for supervision of normal pregnancy (V22 1) (Z34 90)   6  Inguinal hernia (550 90) (K40 90)   7  Nausea and vomiting of pregnancy, antepartum (643 93) (O21 9)   8  Need for Tdap vaccination (V06 1) (Z23)   9  Umbilical hernia (972 7) (K42 9)   10  Varicose veins of legs (454 9) (I83 93)    Past Medical History    1  History of  1 para 1 (V49 89) (Z78 9)    Surgical History    1  Denied: History Of Prior Surgery    Family History  Mother    1  Family history of hypertension (V17 49) (Z82 49)   2  Denied: Family history of substance abuse   3  Family history of thyroid disease (V18 19) (Z83 49)   4  Denied: Family history of Mental health problem  Father    5  Family history of hypertension (V17 49) (Z82 49)   6  Denied: Family history of substance abuse   7  Denied: Family history of Mental health problem    Social History    · Always uses seat belt   · Full-time employment   ·    · Never a smoker   · Occasional alcohol use    Current Meds   1  Breast Pump Miscellaneous; USE AS DIRECTED; Therapy: 78Uvf0531 to (Last Rx:02Olr0911) Ordered   2   Folic Acid 1 MG Oral Tablet; TAKE 1 TABLET DAILY AS DIRECTED; Therapy: 63QDL5154 to (Evaluate:76Pxm4418)  Requested for: 12Oct2017; Last   Rx:09Oct2017 Ordered   3  PrePLUS 27-1 MG Oral Tablet; TAKE 1 TABLET DAILY; Therapy: 75UDZ1552 to (Evaluate:08Bfe1451)  Requested for: 97WKM7243; Last   Rx:09Oct2017 Ordered    Allergies    1  No Known Drug Allergies    2  No Known Environmental Allergies   3  No Known Food Allergies    Future Appointments    Date/Time Provider Specialty Site   12/14/2017 09:00 AM SARAY Flowers   Obstetrics/Gynecology [facility] OB/GYN     Signatures   Electronically signed by : Edmund Andre; Dec 11 2017  1:45PM EST                       (Author)    Electronically signed by : Edmund Andre; Dec 11 2017  1:45PM EST                       (Author)    Electronically signed by : SARAY Watson ; Dec 11 2017  3:10PM EST                       (Author)

## 2018-01-24 VITALS
DIASTOLIC BLOOD PRESSURE: 70 MMHG | BODY MASS INDEX: 24.78 KG/M2 | SYSTOLIC BLOOD PRESSURE: 116 MMHG | WEIGHT: 177 LBS | HEIGHT: 71 IN

## 2018-01-24 VITALS
SYSTOLIC BLOOD PRESSURE: 120 MMHG | HEIGHT: 71 IN | BODY MASS INDEX: 24.8 KG/M2 | DIASTOLIC BLOOD PRESSURE: 72 MMHG | WEIGHT: 177.13 LBS

## 2018-03-07 NOTE — PROGRESS NOTES
Education  Baby & Me Education 1st Trimester:   First Trimester Education provided:   benefits of breastfeeding, importance of exclusive breastfeeding, early initiation of breastfeeding, exclusive breastfeeding for the first 6 months and Other education given: Initial OB packet   The patient is planning on breastfeeding  Contraindication to breastfeeding identified: None  Prenatal education provided by: Jordan Moyer PA-C      Signatures   Electronically signed by : ANA Mcdowell; May 12 2017 11:37AM EST                       (Author)

## 2018-08-09 DIAGNOSIS — Z34.90 PREGNANCY, UNSPECIFIED GESTATIONAL AGE: Primary | ICD-10-CM

## 2018-08-13 RX ORDER — PNV,CALCIUM 72/IRON/FOLIC ACID 27 MG-1 MG
TABLET ORAL
Qty: 90 TABLET | Refills: 2 | Status: SHIPPED | OUTPATIENT
Start: 2018-08-13 | End: 2018-10-28 | Stop reason: ALTCHOICE

## 2018-10-28 ENCOUNTER — APPOINTMENT (OUTPATIENT)
Dept: RADIOLOGY | Facility: MEDICAL CENTER | Age: 33
End: 2018-10-28
Payer: COMMERCIAL

## 2018-10-28 ENCOUNTER — OFFICE VISIT (OUTPATIENT)
Dept: URGENT CARE | Facility: MEDICAL CENTER | Age: 33
End: 2018-10-28
Payer: COMMERCIAL

## 2018-10-28 VITALS
TEMPERATURE: 98.3 F | RESPIRATION RATE: 16 BRPM | OXYGEN SATURATION: 99 % | SYSTOLIC BLOOD PRESSURE: 116 MMHG | HEART RATE: 76 BPM | DIASTOLIC BLOOD PRESSURE: 70 MMHG

## 2018-10-28 DIAGNOSIS — M79.671 RIGHT FOOT PAIN: ICD-10-CM

## 2018-10-28 DIAGNOSIS — S90.31XA CONTUSION OF RIGHT FOOT, INITIAL ENCOUNTER: Primary | ICD-10-CM

## 2018-10-28 PROCEDURE — 73630 X-RAY EXAM OF FOOT: CPT

## 2018-10-28 PROCEDURE — 99213 OFFICE O/P EST LOW 20 MIN: CPT | Performed by: FAMILY MEDICINE

## 2018-10-28 NOTE — PATIENT INSTRUCTIONS
X-ray of right foot reveals no fracture subluxation  Patient's right foot was wrapped with Ace wrap comp all ankle air cast was given  Advised patient to the ice area as needed to continue with ibuprofen  She was advised to switch to Cam boot which she has at home  Follow-up per primary care provider pain     Foot Contusion   WHAT YOU NEED TO KNOW:   A foot contusion is a bruise to the foot  DISCHARGE INSTRUCTIONS:   Medicines:   · NSAIDs:  These medicines decrease swelling and pain  NSAIDs are available without a doctor's order  Ask your healthcare provider which medicine is right for you  Ask how much to take and when to take it  Take as directed  NSAIDs can cause stomach bleeding and kidney problems if not taken correctly  · Take your medicine as directed  Contact your healthcare provider if you think your medicine is not helping or if you have side effects  Tell him of her if you are allergic to any medicine  Keep a list of the medicines, vitamins, and herbs you take  Include the amounts, and when and why you take them  Bring the list or the pill bottles to follow-up visits  Carry your medicine list with you in case of an emergency  Follow up with your healthcare provider as directed:  Write down your questions so you remember to ask them during your visits  Care for your foot: Follow your treatment plan to help decrease your pain and improve your muscle movement  · Rest:  You will need to rest your foot for 1 to 2 days after your injury  This will help decrease the risk of more damage  · Ice:  Ice helps decrease swelling and pain  Ice may also help prevent tissue damage  Use an ice pack, or put crushed ice in a plastic bag  Cover it with a towel and place it on your foot for 15 to 20 minutes every hour or as directed      · Compression:  Compression (tight hold) provides support and helps decrease swelling and movement so your foot can heal  You may be told to keep your foot wrapped with a tight elastic bandage  Follow instructions about how to apply your bandage  Do not massage your foot  You could cause more damage or pain  · Elevation:  Keep your foot raised above the level of your heart while you are sitting or lying down  This will help decrease or limit swelling  Use pillows, blankets, or rolled towels to elevate your foot comfortably  Exercise your foot:  You may be given gentle exercises to improve your foot movement and help decrease stiffness  Ask when you can return to your normal activities or sports  Prevent another injury:   · Wear equipment to protect yourself when you play sports  · Make sure your shoes fit properly  · Always wear shoes on streets or sidewalks  · Clean spills off the floor right away to avoid slipping or hitting your foot  · Make sure your home is well lit when you get up during the night  This will help you avoid hurting your foot in the dark  Contact your healthcare provider if:   · You have increased swelling on your foot  · You have severe foot pain  · You are not able to move your foot  · You have questions or concerns about your injury or treatment  © 2017 [facility] Information is for End User's use only and may not be sold, redistributed or otherwise used for commercial purposes  All illustrations and images included in CareNotes® are the copyrighted property of A D A M , Inc  or Adithya Souza  The above information is an  only  It is not intended as medical advice for individual conditions or treatments   Talk to your doctor, nurse or pharmacist before following any medical regimen to see if it is safe and effective for you    worsens

## 2018-10-28 NOTE — PROGRESS NOTES
[facility]        NAME: Syl Larsen is a 34 y o  female  : 1985    MRN: [id number]  DATE: 2018  TIME: 12:33 PM    Assessment and Plan   Contusion of right foot, initial encounter Mar Lee  1  Contusion of right foot, initial encounter  Ankle Air Cast   2  Right foot pain  XR foot 3+ vw right         Patient Instructions       Follow up with PCP in 3-5 days  Proceed to  ER if symptoms worsen  Chief Complaint     Chief Complaint   Patient presents with    Foot Pain     Pt with complaints of right foot pain, dorsum of foot below ankle towards medial aspect  Pain worse with weight bearing  No known injury  Taking ibuprofen without improvement  Has been icing area  History of Present Illness       Patient complaining of acute right foot pain since yesterday in the evening  Denies any recent trauma or fall denies any noticeable swelling or bruising  Pain is predominant located on the inner aspect below the ankle  It is more painful on weight-bearing  Denies any previous injuries other than childhood history of right ankle sprain  She has been taking ibuprofen with mild improvement  She wore her 's Cam boot seems to offer some relief  Denies any instability of her right ankle  Denies numbness or weakness of the right foot  Pain is currently 8/10 upon weight-bearing  Review of Systems   Review of Systems   Constitutional: Negative  Musculoskeletal: Positive for arthralgias  Negative for joint swelling  Neurological: Negative            Current Medications       Current Outpatient Prescriptions:     ibuprofen (MOTRIN) 600 mg tablet, Take 1 tablet by mouth every 6 (six) hours as needed for mild pain, Disp: 30 tablet, Rfl: 0    Prenatal Vit-Fe Fumarate-FA (PRENATAL 1+1 PO), Take 1 tablet by mouth daily, Disp: , Rfl:     methylergonovine (METHERGINE) 0 2 mg tablet, Take 1 tablet by mouth every 6 (six) hours for 4 doses, Disp: 4 tablet, Rfl: 0    Current Allergies     Allergies as of 10/28/2018    (No Known Allergies)            The following portions of the patient's history were reviewed and updated as appropriate: allergies, current medications, past family history, past medical history, past social history, past surgical history and problem list      Past Medical History:   Diagnosis Date    Female bladder prolapse 2014    Inguinal hernia 2014    Spina bifida [facility]) 0166    Umbilical hernia 7280    Varicella     childhood       Past Surgical History:   Procedure Laterality Date    DILATION AND EVACUATION N/A 12/8/2017    Procedure: DILATATION AND EVACUATION (D&E); Surgeon: Boy Leach MD;  [facility];  Service: Gynecology    WISDOM TOOTH EXTRACTION  2000       Family History   Problem Relation Age of Onset    Hypertension Mother     Arthritis Father     Hypertension Father     Arthritis Maternal Grandmother     Diabetes Maternal Grandmother     Hypertension Maternal Grandmother     Arthritis Paternal Grandmother     Diabetes Paternal Grandmother     Hypertension Paternal Grandmother          Medications have been verified  Objective   /70 (BP Location: Left arm, Patient Position: Sitting, Cuff Size: Standard)   Pulse 76   Temp 98 3 °F (36 8 °C) (Tympanic)   Resp 16   SpO2 99%        Physical Exam     Physical Exam   Musculoskeletal: Normal range of motion  She exhibits tenderness  She exhibits no edema or deformity  Right lower extremity-full range on plantar flexion dorsiflexion, full range on inversion and eversion of the ankle  If significant point tenderness of the medial aspect specific go over the right 1st cuneiform bone  There is no crepitation appreciated  Anterior drawer sign is negative  No evidence of effusion or ecchymosis of the medial aspect  Nursing note and vitals reviewed

## 2018-12-05 ENCOUNTER — TELEPHONE (OUTPATIENT)
Dept: OBGYN CLINIC | Facility: CLINIC | Age: 33
End: 2018-12-05

## 2018-12-05 NOTE — TELEPHONE ENCOUNTER
Patient is calling requesting for some blood work to be done to check her thyroid  Patient stated she has just finished breast feeding and is wanting to check on the before her appointment 2/5/2019 and discuss the results with you   Best number to call back to would be [phone]

## 2018-12-07 ENCOUNTER — TELEPHONE (OUTPATIENT)
Dept: OBGYN CLINIC | Facility: CLINIC | Age: 33
End: 2018-12-07

## 2018-12-07 DIAGNOSIS — R63.5 WEIGHT GAIN: Primary | ICD-10-CM

## 2018-12-07 PROBLEM — Z3A.38 38 WEEKS GESTATION OF PREGNANCY: Status: RESOLVED | Noted: 2017-11-28 | Resolved: 2018-12-07

## 2018-12-07 NOTE — TELEPHONE ENCOUNTER
Spoke with patient regarding possible thyroid testing  States that she gained 20 lbs while breastfeeding; just stopped 2 days ago  Notes a change in appetite; doesn't feel hungry "when I think I should"  Has started to increase exercise in an effort to lose weight  Patient has had two periods already  Notes a family history of thyroid disease in her mother  Has appointment on 2/5 for yearly gyn exam   Order for thyroid lab entered  She will go right before her appointment  Call with any symptom changes in the meantime

## 2019-02-19 ENCOUNTER — OFFICE VISIT (OUTPATIENT)
Dept: PHYSICAL THERAPY | Facility: REHABILITATION | Age: 34
End: 2019-02-19
Payer: COMMERCIAL

## 2019-02-19 DIAGNOSIS — M54.6 RIGHT-SIDED THORACIC BACK PAIN, UNSPECIFIED CHRONICITY: Primary | ICD-10-CM

## 2019-02-19 PROCEDURE — 97161 PT EVAL LOW COMPLEX 20 MIN: CPT | Performed by: PHYSICAL THERAPIST

## 2019-02-19 NOTE — PROGRESS NOTES
PT Evaluation     Today's date: 2019  Patient name: Damon Purvis  : 1985  MRN: [id number]  Referring provider: Angelica Fajardo PT  Dx:   Encounter Diagnosis     ICD-10-CM    1  Right-sided thoracic back pain, unspecified chronicity M54 6                   Assessment  Assessment details: 27 y/o female with c/o right-sided mid-thoracic pain and N/T that started about one month ago of insidious onset  She denies any macro trauma or minor injuries  She denies any change in activity leading up to the onset of the sx's  However, she does report stopping to breast feed in 2018  She denies any HA's or red flag sx's  (-) 5 D's and 3 N's  Also, she denies any sx's peripheralizing into the UE         Impairments: abnormal muscle tone, abnormal or restricted ROM, pain with function and poor posture     Symptom irritability: lowBarriers to therapy: Limitations with attending PT due to her work schedule and   Understanding of Dx/Px/POC: good   Prognosis: good    Goals  ST - I with HEP  2 - drive > 30 minutes with < 1/10 sx's  LT - FOTO > 86  2 - right static scap symmetrical to left  3 - improve mid-thoracic segmental mobility  4 - hold daughter for > 5 minutes with < 1/10 pain    Plan  Patient would benefit from: skilled physical therapy  Planned therapy interventions: joint mobilization, manual therapy, strengthening, therapeutic activities, therapeutic exercise, neuromuscular re-education, patient education, postural training and home exercise program  Frequency: 1x week  Duration in weeks: 5  Treatment plan discussed with: patient        Subjective Evaluation    Quality of life: good    Pain  Current pain ratin  At worst pain ratin  Quality: dull ache and throbbing  Relieving factors: change in position      Diagnostic Tests  No diagnostic tests performed  Treatments  No previous or current treatments  Patient Goals  Patient goals for therapy: increased strength, decreased pain, increased motion, independence with ADLs/IADLs and return to sport/leisure activities          Objective     Concurrent Complaints  Positive for disturbed sleep  Negative for night pain, dizziness, faints, headaches, nausea/motion sickness, tinnitus, trouble swallowing, difficulty breathing, shortness of breath, respiratory pain, visual change and history of trauma    Postural Observations  Seated posture: fair  Correction of posture: makes symptoms better        Palpation     Right   Hypertonic in the cervical paraspinals, levator scapulae, pectoralis minor and upper trapezius  Tenderness of the cervical paraspinals, levator scapulae, pectoralis minor and suboccipitals       Neurological Testing     Sensation   Cervical/Thoracic   Left   Intact: pin prick    Right   Intact: pin prick    Reflexes   Left   Biceps (C5/C6): normal (2+)  Brachioradialis (C6): normal (2+)  Triceps (C7): normal (2+)    Right   Biceps (C5/C6): normal (2+)  Brachioradialis (C6): normal (2+)  Triceps (C7): normal (2+)    Active Range of Motion   Cervical/Thoracic Spine       Cervical    Flexion:  WFL  Extension:  Restriction level: minimal  Left lateral flexion:  Restriction level: moderate  Right lateral flexion:  with pain Restriction level moderate  Left rotation:  WFL  Right rotation:  [facility]    Thoracic    Left rotation:  Restriction level: minimal  Right rotation:  Restriction level: moderate    Additional Active Range of Motion Details  cx flexion/rotation test:  Decreased left rotation    cx extension:  Aberrant motion (head shifts to left)    Joint Play     Hypomobile: C7, T1, T4, T5 and T6     Pain: T4     Comments: Prone mid-thoracic segmental mobility:  Decreased left rotation  Mechanical Assessment    Cervical      Thoracic    Seated left rotation: sustained positions  Seated right rotation: sustained positions  Pain location: no change  Pain level: produced    Lumbar      Strength/Myotome Testing   Cervical Spine Neck flexion: WFL    General Comments:      Cervical/Thoracic Comments   (pos 2):   Right = 87#, left = 78#    Key pinch:  B/l = 20#    Static scap:  Right = 3 25 inches, left = 3 inches  Neuro Exam:     Headaches   Patient reports headaches: No            Precautions: spina bifida    Daily Treatment Diary     Manual  02/19            Prone tissue deformation - med scap border LMR            Prone tissue deformation - lev scap LMR            Prone gr 4 right rotation T4 LMR            Supine gr 5 mid-thoracic gapping LMR                             Exercise Diary  02/19            Peanut - self-mobs LMR            Banded pull-a-parts Peach - 5"x5            Face pulls Thick orange - 2x5            hep LMR                                                                                                                                                                                                                                Modalities

## 2019-02-21 ENCOUNTER — APPOINTMENT (OUTPATIENT)
Dept: LAB | Facility: CLINIC | Age: 34
End: 2019-02-21
Payer: COMMERCIAL

## 2019-02-21 DIAGNOSIS — R63.5 WEIGHT GAIN: ICD-10-CM

## 2019-02-21 LAB
T4 FREE SERPL-MCNC: 0.88 NG/DL (ref 0.76–1.46)
TSH SERPL DL<=0.05 MIU/L-ACNC: 1.81 UIU/ML (ref 0.36–3.74)

## 2019-02-21 PROCEDURE — 36415 COLL VENOUS BLD VENIPUNCTURE: CPT

## 2019-02-21 PROCEDURE — 84443 ASSAY THYROID STIM HORMONE: CPT

## 2019-02-21 PROCEDURE — 84439 ASSAY OF FREE THYROXINE: CPT

## 2019-02-22 ENCOUNTER — TELEPHONE (OUTPATIENT)
Dept: OBGYN CLINIC | Facility: CLINIC | Age: 34
End: 2019-02-22

## 2019-02-22 NOTE — TELEPHONE ENCOUNTER
Spoke with patient regarding thyroid blood work - WNL  Patient transferring to [address] [place] [address] as it is closer to her house  Has appointment on 2/26

## 2019-02-26 ENCOUNTER — OFFICE VISIT (OUTPATIENT)
Dept: PHYSICAL THERAPY | Facility: REHABILITATION | Age: 34
End: 2019-02-26
Payer: COMMERCIAL

## 2019-02-26 ENCOUNTER — ANNUAL EXAM (OUTPATIENT)
Dept: OBGYN CLINIC | Facility: CLINIC | Age: 34
End: 2019-02-26
Payer: COMMERCIAL

## 2019-02-26 VITALS
BODY MASS INDEX: 25.54 KG/M2 | DIASTOLIC BLOOD PRESSURE: 72 MMHG | WEIGHT: 178.4 LBS | HEIGHT: 70 IN | SYSTOLIC BLOOD PRESSURE: 122 MMHG

## 2019-02-26 DIAGNOSIS — N81.2 CYSTOCELE AND RECTOCELE WITH INCOMPLETE UTEROVAGINAL PROLAPSE: ICD-10-CM

## 2019-02-26 DIAGNOSIS — Z01.419 ENCOUNTER FOR GYNECOLOGICAL EXAMINATION (GENERAL) (ROUTINE) WITHOUT ABNORMAL FINDINGS: Primary | ICD-10-CM

## 2019-02-26 DIAGNOSIS — M54.6 RIGHT-SIDED THORACIC BACK PAIN, UNSPECIFIED CHRONICITY: Primary | ICD-10-CM

## 2019-02-26 PROCEDURE — 97112 NEUROMUSCULAR REEDUCATION: CPT | Performed by: PHYSICAL THERAPIST

## 2019-02-26 PROCEDURE — 97140 MANUAL THERAPY 1/> REGIONS: CPT | Performed by: PHYSICAL THERAPIST

## 2019-02-26 PROCEDURE — 97110 THERAPEUTIC EXERCISES: CPT | Performed by: PHYSICAL THERAPIST

## 2019-02-26 PROCEDURE — S0612 ANNUAL GYNECOLOGICAL EXAMINA: HCPCS | Performed by: OBSTETRICS & GYNECOLOGY

## 2019-02-26 RX ORDER — DIPHENOXYLATE HYDROCHLORIDE AND ATROPINE SULFATE 2.5; .025 MG/1; MG/1
1 TABLET ORAL DAILY
COMMUNITY

## 2019-02-26 NOTE — PROGRESS NOTES
Daily Note     Today's date: 2019  Patient name: Prince River  : 1985  MRN: [id number]  Referring provider: Nichole Dela Cruz, PT  Dx:   Encounter Diagnosis     ICD-10-CM    1  Right-sided thoracic back pain, unspecified chronicity M54 6        Start Time: 1125  Stop Time: 1215  Total time in clinic (min): 50 minutes    Subjective: Pt notes compliance with the current HEP  She notes decreased frequency of N/T, but when it does occur the intensity is higher  Objective: See treatment diary below  Current HEP reviewed  HEP updated with prone cobra, q-ped thoracic rotation to left, and standing banded row/rotation  Assessment: Tolerated treatment well  Patient would benefit from continued PT      Plan: Continue per plan of care           Precautions: spina bifida    Daily Treatment Diary     Manual             Prone tissue deformation - med scap border LMR            Prone tissue deformation - lev scap LMR            Prone gr 4 right rotation T4 LMR            Supine gr 5 mid-thoracic gapping LMR            Tissue deformation - right thoracic paraspinals  Frank - LMR           Gr 4 P-A thoracic mobs  Frank - LMR           Prone gr 4 right rotation mid-thoracic mobs  LMR                                                      Exercise Diary             Peanut - self-mobs LMR LMR           Banded pull-a-parts Peach - 5"x5 hep           Face pulls Thick orange - 2x5 hep           hep LMR LMR           q-ped thoracic rotation reach through (to left)  With breathing - 2x5           B/l horiz abd  peach - 2x5           Standing u/l row + mini thoracic rotation  grn - 3x5           Prone cobra  30"x4                                                                                                                                                                           Modalities

## 2019-02-26 NOTE — PROGRESS NOTES
Assessment/Plan   Diagnoses and all orders for this visit:    Encounter for gynecological examination (general) (routine) without abnormal findings    Cystocele and rectocele with incomplete uterovaginal prolapse   Current we relatively asymptomatic at this time we discussed signs and symptoms to keep a watch for  If anything changes or worsens she will call  We discussed if she were to need surgery in the future I would refer her to Urogynecology  Other orders  -     multivitamin (THERAGRAN) TABS; Take 1 tablet by mouth daily  -     Probiotic Product (PROBIOTIC PO); Take by mouth      Discussion    Reviewed with patient normal exam today  Pap not done since had normal with HPV negative in 2017 and negative 2018  Normal breast exam today  Monthly SBEs advised  Vitamin D and Calcim Supplements advised  Exercise most days of the week  Follow with PCP for regular check-ups and blood work  RTO 1 year for annual or sooner as needed  Subjective     Pt is here for her annual exam   She is doing well without complaints  She does have a h o umbilical and left inguinal hernia and prolapse  She is not having trouble with any of them  She is sexually active and uses condoms for birth control  She declines any STD testing  She had a normal pap with negative HPV in 2017 and then a negative pap in 2018  We discussed her prolapse symptoms  She states sometimes she will feel a little something in her vagina and sometimes she will have to go to the bathroom twice to fully empty  She denies any pelvic pain or pressure  She does not feel a bulge in her vagina she does not have any pain with intercourse  She is constipated but that has been a chronic problem for her and she does take probiotics and has tried been drinking more water  She recently stopped breast-feeding in December  She has a 3year-old  At this point she does not feel like her prolapse is bothersome for a now for any treatment        Review of Systems   Constitutional: Negative for activity change and appetite change  Gastrointestinal: Negative for abdominal pain  Genitourinary: Negative for pelvic pain  All other systems reviewed and are negative  Period Cycle (Days): 28  Period Duration (Days): 4  Period Pattern: Regular  Menstrual Flow: Moderate    OB History        2    Para   2    Term   2            AB        Living   2       SAB        TAB        Ectopic        Multiple   0    Live Births   2                 Past Medical History:   Diagnosis Date    Female bladder prolapse     Inguinal hernia 2014    Spina bifida [facility]) 4282    Umbilical hernia 9183    Varicella     childhood       Past Surgical History:   Procedure Laterality Date    DILATION AND EVACUATION N/A 2017    Procedure: DILATATION AND EVACUATION (D&E);   Surgeon: Boy Leach MD;  [facility];  Service: Gynecology    WISDOM TOOTH EXTRACTION         Family History   Problem Relation Age of Onset    Hypertension Mother     Arthritis Father     Hypertension Father     Arthritis Maternal Grandmother     Diabetes Maternal Grandmother     Hypertension Maternal Grandmother     Arthritis Paternal Grandmother     Diabetes Paternal Grandmother     Hypertension Paternal Grandmother        Social History     Socioeconomic History    Marital status: /Civil Union     Spouse name: Not on file    Number of children: Not on file    Years of education: Not on file    Highest education level: Not on file   Occupational History    Not on file   Social Needs    Financial resource strain: Not on file    Food insecurity:     Worry: Not on file     Inability: Not on file    Transportation needs:     Medical: Not on file     Non-medical: Not on file   Tobacco Use    Smoking status: Never Smoker    Smokeless tobacco: Never Used   Substance and Sexual Activity    Alcohol use: No    Drug use: No    Sexual activity: Yes Partners: Male     Birth control/protection: Condom   Lifestyle    Physical activity:     Days per week: Not on file     Minutes per session: Not on file    Stress: Not on file   Relationships    Social connections:     Talks on phone: Not on file     Gets together: Not on file     Attends [employer] service: Not on file     Active member of club or organization: Not on file     Attends meetings of clubs or organizations: Not on file     Relationship status: Not on file    Intimate partner violence:     Fear of current or ex partner: Not on file     Emotionally abused: Not on file     Physically abused: Not on file     Forced sexual activity: Not on file   Other Topics Concern    Not on file   Social History Narrative    Not on file         Current Outpatient Medications:     multivitamin (THERAGRAN) TABS, Take 1 tablet by mouth daily, Disp: , Rfl:     Probiotic Product (PROBIOTIC PO), Take by mouth, Disp: , Rfl:     ibuprofen (MOTRIN) 600 mg tablet, Take 1 tablet by mouth every 6 (six) hours as needed for mild pain (Patient not taking: Reported on 2/26/2019), Disp: 30 tablet, Rfl: 0    methylergonovine (METHERGINE) 0 2 mg tablet, Take 1 tablet by mouth every 6 (six) hours for 4 doses, Disp: 4 tablet, Rfl: 0    Prenatal Vit-Fe Fumarate-FA (PRENATAL 1+1 PO), Take 1 tablet by mouth daily, Disp: , Rfl:     No Known Allergies    Objective   Vitals:    02/26/19 1028   BP: 122/72   Weight: 80 9 kg (178 lb 6 4 oz)   Height: 5' 10" (1 778 m)     Physical Exam   Constitutional: She is oriented to person, place, and time  She appears well-developed and well-nourished  HENT:   Head: Normocephalic and atraumatic  Neck: Normal range of motion  Neck supple  Cardiovascular: Normal rate, regular rhythm and normal heart sounds  No murmur heard  Pulmonary/Chest: Effort normal and breath sounds normal  No respiratory distress  She has no wheezes   Right breast exhibits no inverted nipple, no mass, no nipple discharge, no skin change and no tenderness  Left breast exhibits no inverted nipple, no mass, no nipple discharge, no skin change and no tenderness  Breasts are symmetrical    Abdominal: Soft  Bowel sounds are normal  She exhibits no distension  There is no tenderness  There is no rebound and no guarding  Genitourinary: Vagina normal  There is no rash on the right labia  There is no rash on the left labia  Uterus is not enlarged and not tender  Cervix exhibits no motion tenderness and no discharge  Right adnexum displays no mass, no tenderness and no fullness  Left adnexum displays no mass, no tenderness and no fullness  No vaginal discharge found  Genitourinary Comments: Grade 2 cystocele, Grade 1-2 descensus, Grade 1-2 rectocele all with valsalva  Musculoskeletal: Normal range of motion  Neurological: She is alert and oriented to person, place, and time  She has normal reflexes  Skin: Skin is warm and dry  Psychiatric: She has a normal mood and affect   Her behavior is normal  Judgment and thought content normal

## 2019-03-07 ENCOUNTER — OFFICE VISIT (OUTPATIENT)
Dept: PHYSICAL THERAPY | Facility: REHABILITATION | Age: 34
End: 2019-03-07
Payer: COMMERCIAL

## 2019-03-07 DIAGNOSIS — M54.6 RIGHT-SIDED THORACIC BACK PAIN, UNSPECIFIED CHRONICITY: Primary | ICD-10-CM

## 2019-03-07 PROCEDURE — 97110 THERAPEUTIC EXERCISES: CPT | Performed by: PHYSICAL THERAPIST

## 2019-03-07 PROCEDURE — 97140 MANUAL THERAPY 1/> REGIONS: CPT | Performed by: PHYSICAL THERAPIST

## 2019-03-07 NOTE — PROGRESS NOTES
Daily Note     Today's date: 3/7/2019  Patient name: Rhesa Ormond  : 1985  MRN: [id number]  Referring provider: Rajesh Jarrett, PT  Dx:   Encounter Diagnosis     ICD-10-CM    1  Right-sided thoracic back pain, unspecified chronicity M54 6        Start Time: 735  Stop Time: 0815  Total time in clinic (min): 40 minutes    Subjective:  Pt denies any N/T in her thoracic region since the last PT session  Objective: See treatment diary below  HEP updated with q-ped banded lower trap lift and q-ped banded pnf strap rotation  Orange band issued  FOTO has increased from 70 to 99 indicating improvement  ST - I with HEP - MET  2 - drive > 30 minutes with < 1/10 sx's - MET  LT - FOTO > 86 - MET  2 - right static scap symmetrical to left - MET  3 - improve mid-thoracic segmental mobility - MET  4 - hold daughter for > 5 minutes with < 1/10 pain - MET    Assessment: Tolerated treatment well  Patient exhibited good technique with therapeutic exercises      Plan: d/c to ongoing hep  Pt in agreement with the POC         Precautions: spina bifida    Daily Treatment Diary     Manual   03/          Prone tissue deformation - med scap border LMR            Prone tissue deformation - lev scap LMR            Prone gr 4 right rotation T4 LMR  LMR          Supine gr 5 mid-thoracic gapping LMR            Tissue deformation - right thoracic paraspinals  Frank - LMR LMR          Gr 4 P-A thoracic mobs  Frank - LMR           Prone gr 4 right rotation mid-thoracic mobs  LMR LMR                       assessment   LMR                           Exercise Diary   03/07          Peanut - self-mobs LMR LMR           Banded pull-a-parts Peach - 5"x5 hep           Face pulls Thick orange - 2x5 hep           hep LMR LMR LMR          q-ped thoracic rotation reach through (to left)  With breathing - 2x5           B/l horiz abd  peach - 2x5           Standing u/l row + mini thoracic rotation  grn - 3x5 Prone cobra  30"x4           q-ped - LT lift   peach - 3x3          q-ped - thoracic rotation - PNF   15"x5                                                                                                                                                Modalities

## 2019-03-12 ENCOUNTER — APPOINTMENT (OUTPATIENT)
Dept: PHYSICAL THERAPY | Facility: REHABILITATION | Age: 34
End: 2019-03-12
Payer: COMMERCIAL

## 2019-06-20 ENCOUNTER — OFFICE VISIT (OUTPATIENT)
Dept: URGENT CARE | Facility: CLINIC | Age: 34
End: 2019-06-20
Payer: COMMERCIAL

## 2019-06-20 VITALS
WEIGHT: 170 LBS | HEART RATE: 65 BPM | SYSTOLIC BLOOD PRESSURE: 114 MMHG | BODY MASS INDEX: 23.8 KG/M2 | TEMPERATURE: 98.9 F | HEIGHT: 71 IN | RESPIRATION RATE: 20 BRPM | DIASTOLIC BLOOD PRESSURE: 62 MMHG | OXYGEN SATURATION: 100 %

## 2019-06-20 DIAGNOSIS — H10.32 ACUTE BACTERIAL CONJUNCTIVITIS OF LEFT EYE: Primary | ICD-10-CM

## 2019-06-20 PROCEDURE — 99213 OFFICE O/P EST LOW 20 MIN: CPT | Performed by: PHYSICIAN ASSISTANT

## 2019-06-20 RX ORDER — POLYMYXIN B SULFATE AND TRIMETHOPRIM 1; 10000 MG/ML; [USP'U]/ML
1 SOLUTION OPHTHALMIC EVERY 4 HOURS
Qty: 10 ML | Refills: 0 | Status: SHIPPED | OUTPATIENT
Start: 2019-06-20 | End: 2019-06-27

## 2019-11-01 ENCOUNTER — OFFICE VISIT (OUTPATIENT)
Dept: FAMILY MEDICINE CLINIC | Facility: CLINIC | Age: 34
End: 2019-11-01
Payer: COMMERCIAL

## 2019-11-01 ENCOUNTER — TRANSCRIBE ORDERS (OUTPATIENT)
Dept: ADMINISTRATIVE | Facility: HOSPITAL | Age: 34
End: 2019-11-01

## 2019-11-01 ENCOUNTER — APPOINTMENT (OUTPATIENT)
Dept: LAB | Facility: CLINIC | Age: 34
End: 2019-11-01
Payer: COMMERCIAL

## 2019-11-01 VITALS
HEIGHT: 71 IN | DIASTOLIC BLOOD PRESSURE: 76 MMHG | RESPIRATION RATE: 18 BRPM | HEART RATE: 74 BPM | WEIGHT: 161.8 LBS | SYSTOLIC BLOOD PRESSURE: 118 MMHG | BODY MASS INDEX: 22.65 KG/M2

## 2019-11-01 DIAGNOSIS — R04.0 EPISTAXIS: ICD-10-CM

## 2019-11-01 DIAGNOSIS — R04.0 EPISTAXIS: Primary | ICD-10-CM

## 2019-11-01 DIAGNOSIS — R04.0 BLEEDING FROM THE NOSE: Primary | ICD-10-CM

## 2019-11-01 PROBLEM — N81.10 BLADDER PROLAPSE, FEMALE, ACQUIRED: Status: ACTIVE | Noted: 2017-07-14

## 2019-11-01 PROBLEM — I83.93 VARICOSE VEINS OF LEGS: Status: ACTIVE | Noted: 2017-09-29

## 2019-11-01 PROBLEM — K42.9 UMBILICAL HERNIA: Status: ACTIVE | Noted: 2017-07-14

## 2019-11-01 PROBLEM — K40.90 INGUINAL HERNIA: Status: ACTIVE | Noted: 2017-06-02

## 2019-11-01 LAB
BASOPHILS # BLD AUTO: 0.06 THOUSANDS/ΜL (ref 0–0.1)
BASOPHILS NFR BLD AUTO: 1 % (ref 0–1)
EOSINOPHIL # BLD AUTO: 0 THOUSAND/ΜL (ref 0–0.61)
EOSINOPHIL NFR BLD AUTO: 0 % (ref 0–6)
ERYTHROCYTE [DISTWIDTH] IN BLOOD BY AUTOMATED COUNT: 11.8 % (ref 11.6–15.1)
HCT VFR BLD AUTO: 40.4 % (ref 34.8–46.1)
HGB BLD-MCNC: 13 G/DL (ref 11.5–15.4)
IMM GRANULOCYTES # BLD AUTO: 0.01 THOUSAND/UL (ref 0–0.2)
IMM GRANULOCYTES NFR BLD AUTO: 0 % (ref 0–2)
LYMPHOCYTES # BLD AUTO: 1.77 THOUSANDS/ΜL (ref 0.6–4.47)
LYMPHOCYTES NFR BLD AUTO: 29 % (ref 14–44)
MCH RBC QN AUTO: 32.6 PG (ref 26.8–34.3)
MCHC RBC AUTO-ENTMCNC: 32.2 G/DL (ref 31.4–37.4)
MCV RBC AUTO: 101 FL (ref 82–98)
MONOCYTES # BLD AUTO: 0.49 THOUSAND/ΜL (ref 0.17–1.22)
MONOCYTES NFR BLD AUTO: 8 % (ref 4–12)
NEUTROPHILS # BLD AUTO: 3.72 THOUSANDS/ΜL (ref 1.85–7.62)
NEUTS SEG NFR BLD AUTO: 62 % (ref 43–75)
NRBC BLD AUTO-RTO: 0 /100 WBCS
PLATELET # BLD AUTO: 216 THOUSANDS/UL (ref 149–390)
PMV BLD AUTO: 11.8 FL (ref 8.9–12.7)
RBC # BLD AUTO: 3.99 MILLION/UL (ref 3.81–5.12)
WBC # BLD AUTO: 6.05 THOUSAND/UL (ref 4.31–10.16)

## 2019-11-01 PROCEDURE — 36415 COLL VENOUS BLD VENIPUNCTURE: CPT

## 2019-11-01 PROCEDURE — 3008F BODY MASS INDEX DOCD: CPT | Performed by: PHYSICIAN ASSISTANT

## 2019-11-01 PROCEDURE — 99213 OFFICE O/P EST LOW 20 MIN: CPT | Performed by: PHYSICIAN ASSISTANT

## 2019-11-01 PROCEDURE — 85025 COMPLETE CBC W/AUTO DIFF WBC: CPT

## 2019-11-01 NOTE — PROGRESS NOTES
Assessment/Plan:    1  Bleeding from the nose    - reassurance, will check CBC for patients peace of mind, apply Vaseline 4-5 times a day on a Q-tip, run humidifier in room at night, keep ENT appt on 11/5/2019   - CBC and differential; Future    F/u as needed    Subjective:   Chief Complaint   Patient presents with   [address]      Patient ID: Muriel Room is a 28 y o  female  3 weeks of nose bleeds, coming more frequently now, every day, had three yesterday  Tried vaseline with no relief  Couldn't even do nasal saline spray because kept bleeding  Bleeding less then 10 minutes  Left nostril only  Appt 11/5/2019 ENT Aggie Anguiano      The following portions of the patient's history were reviewed and updated as appropriate: allergies, current medications, past family history, past medical history, past social history, past surgical history and problem list     Past Medical History:   Diagnosis Date    Female bladder prolapse 2014    Inguinal hernia 2014    Spina bifida [facility]) 3087    Umbilical hernia 5703    Varicella     childhood     Past Surgical History:   Procedure Laterality Date    DILATION AND EVACUATION N/A 12/8/2017    Procedure: DILATATION AND EVACUATION (D&E);   Surgeon: Live Bundy MD;  [facility];  Service: Gynecology    WISDOM TOOTH EXTRACTION  2000     Family History   Problem Relation Age of Onset    Hypertension Mother     Arthritis Father     Hypertension Father     Arthritis Maternal Grandmother     Diabetes Maternal Grandmother     Hypertension Maternal Grandmother     Arthritis Paternal Grandmother     Diabetes Paternal Grandmother     Hypertension Paternal Grandmother      Social History     Socioeconomic History    Marital status: /Civil Union     Spouse name: Not on file    Number of children: Not on file    Years of education: Not on file    Highest education level: Not on file   Occupational History    Not on file   Social Needs    Financial resource strain: Not on file    Food insecurity:     Worry: Not on file     Inability: Not on file    Transportation needs:     Medical: Not on file     Non-medical: Not on file   Tobacco Use    Smoking status: Never Smoker    Smokeless tobacco: Never Used   Substance and Sexual Activity    Alcohol use: Yes    Drug use: No    Sexual activity: Yes     Partners: Male     Birth control/protection: Condom   Lifestyle    Physical activity:     Days per week: Not on file     Minutes per session: Not on file    Stress: Not on file   Relationships    Social connections:     Talks on phone: Not on file     Gets together: Not on file     Attends Yazdanism service: Not on file     Active member of club or organization: Not on file     Attends meetings of clubs or organizations: Not on file     Relationship status: Not on file    Intimate partner violence:     Fear of current or ex partner: Not on file     Emotionally abused: Not on file     Physically abused: Not on file     Forced sexual activity: Not on file   Other Topics Concern    Not on file   Social History Narrative    Not on file       Current Outpatient Medications:     multivitamin (THERAGRAN) TABS, Take 1 tablet by mouth daily, Disp: , Rfl:     methylergonovine (METHERGINE) 0 2 mg tablet, Take 1 tablet by mouth every 6 (six) hours for 4 doses, Disp: 4 tablet, Rfl: 0    Review of Systems          Objective:    Vitals:    11/01/19 1025   BP: 118/76   BP Location: Left arm   Patient Position: Sitting   Cuff Size: Standard   Pulse: 74   Resp: 18   Weight: 73 4 kg (161 lb 12 8 oz)   Height: 5' 10 75" (1 797 m)        Physical Exam   Constitutional: She is oriented to person, place, and time  She appears well-developed and well-nourished  HENT:   Head: Normocephalic and atraumatic     Right Ear: Tympanic membrane, external ear and ear canal normal    Left Ear: Tympanic membrane, external ear and ear canal normal    Nose: Mucosal edema and rhinorrhea present  Mouth/Throat: Oropharynx is clear and moist  No oropharyngeal exudate or posterior oropharyngeal erythema  Dried blood in left nares   Cardiovascular: Normal rate, regular rhythm and normal heart sounds  Pulmonary/Chest: Effort normal and breath sounds normal    Lymphadenopathy:     She has no cervical adenopathy  Neurological: She is alert and oriented to person, place, and time  Skin: Skin is warm  Psychiatric: She has a normal mood and affect   Judgment normal

## 2019-11-06 ENCOUNTER — APPOINTMENT (OUTPATIENT)
Dept: LAB | Facility: CLINIC | Age: 34
End: 2019-11-06
Payer: COMMERCIAL

## 2019-11-06 ENCOUNTER — TRANSCRIBE ORDERS (OUTPATIENT)
Dept: LAB | Facility: CLINIC | Age: 34
End: 2019-11-06

## 2019-11-06 DIAGNOSIS — IMO0001 ASYMMETRICAL RIGHT SENSORINEURAL HEARING LOSS: ICD-10-CM

## 2019-11-06 DIAGNOSIS — R42 DIZZINESS: ICD-10-CM

## 2019-11-06 DIAGNOSIS — H93.11 RIGHT-SIDED TINNITUS: ICD-10-CM

## 2019-11-06 DIAGNOSIS — R04.0 BLEEDING FROM THE NOSE: ICD-10-CM

## 2019-11-06 DIAGNOSIS — IMO0001 ASYMMETRICAL RIGHT SENSORINEURAL HEARING LOSS: Primary | ICD-10-CM

## 2019-11-06 DIAGNOSIS — R42 DIZZINESS AND GIDDINESS: ICD-10-CM

## 2019-11-06 LAB
BASOPHILS # BLD AUTO: 0.05 THOUSANDS/ΜL (ref 0–0.1)
BASOPHILS NFR BLD AUTO: 1 % (ref 0–1)
BUN SERPL-MCNC: 14 MG/DL (ref 5–25)
CREAT SERPL-MCNC: 0.61 MG/DL (ref 0.6–1.3)
EOSINOPHIL # BLD AUTO: 0 THOUSAND/ΜL (ref 0–0.61)
EOSINOPHIL NFR BLD AUTO: 0 % (ref 0–6)
ERYTHROCYTE [DISTWIDTH] IN BLOOD BY AUTOMATED COUNT: 11.4 % (ref 11.6–15.1)
GFR SERPL CREATININE-BSD FRML MDRD: 118 ML/MIN/1.73SQ M
HCT VFR BLD AUTO: 40.4 % (ref 34.8–46.1)
HGB BLD-MCNC: 13.1 G/DL (ref 11.5–15.4)
IMM GRANULOCYTES # BLD AUTO: 0.01 THOUSAND/UL (ref 0–0.2)
IMM GRANULOCYTES NFR BLD AUTO: 0 % (ref 0–2)
LYMPHOCYTES # BLD AUTO: 2.8 THOUSANDS/ΜL (ref 0.6–4.47)
LYMPHOCYTES NFR BLD AUTO: 41 % (ref 14–44)
MCH RBC QN AUTO: 32.8 PG (ref 26.8–34.3)
MCHC RBC AUTO-ENTMCNC: 32.4 G/DL (ref 31.4–37.4)
MCV RBC AUTO: 101 FL (ref 82–98)
MONOCYTES # BLD AUTO: 0.46 THOUSAND/ΜL (ref 0.17–1.22)
MONOCYTES NFR BLD AUTO: 7 % (ref 4–12)
NEUTROPHILS # BLD AUTO: 3.49 THOUSANDS/ΜL (ref 1.85–7.62)
NEUTS SEG NFR BLD AUTO: 51 % (ref 43–75)
NRBC BLD AUTO-RTO: 0 /100 WBCS
PLATELET # BLD AUTO: 226 THOUSANDS/UL (ref 149–390)
PMV BLD AUTO: 11.6 FL (ref 8.9–12.7)
RBC # BLD AUTO: 4 MILLION/UL (ref 3.81–5.12)
WBC # BLD AUTO: 6.81 THOUSAND/UL (ref 4.31–10.16)

## 2019-11-06 PROCEDURE — 36415 COLL VENOUS BLD VENIPUNCTURE: CPT

## 2019-11-06 PROCEDURE — 86618 LYME DISEASE ANTIBODY: CPT

## 2019-11-06 PROCEDURE — 82565 ASSAY OF CREATININE: CPT

## 2019-11-06 PROCEDURE — 85025 COMPLETE CBC W/AUTO DIFF WBC: CPT

## 2019-11-06 PROCEDURE — 84520 ASSAY OF UREA NITROGEN: CPT

## 2019-11-07 LAB — B BURGDOR IGG+IGM SER-ACNC: <0.91 ISR (ref 0–0.9)

## 2019-11-15 ENCOUNTER — HOSPITAL ENCOUNTER (OUTPATIENT)
Dept: MRI IMAGING | Facility: HOSPITAL | Age: 34
Discharge: HOME | End: 2019-11-15
Payer: COMMERCIAL

## 2019-11-15 DIAGNOSIS — R42 DIZZINESS: ICD-10-CM

## 2019-11-15 DIAGNOSIS — IMO0001 ASYMMETRICAL RIGHT SENSORINEURAL HEARING LOSS: ICD-10-CM

## 2019-11-15 DIAGNOSIS — H93.11 RIGHT-SIDED TINNITUS: ICD-10-CM

## 2019-11-15 PROCEDURE — 70553 MRI BRAIN STEM W/O & W/DYE: CPT

## 2019-11-15 PROCEDURE — A9585 GADOBUTROL INJECTION: HCPCS | Performed by: PHYSICIAN ASSISTANT

## 2019-11-15 RX ADMIN — GADOBUTROL 7 ML: 604.72 INJECTION INTRAVENOUS at 11:47

## 2020-04-01 ENCOUNTER — TELEMEDICINE (OUTPATIENT)
Dept: FAMILY MEDICINE CLINIC | Facility: CLINIC | Age: 35
End: 2020-04-01
Payer: COMMERCIAL

## 2020-04-01 VITALS — TEMPERATURE: 97.2 F | DIASTOLIC BLOOD PRESSURE: 81 MMHG | SYSTOLIC BLOOD PRESSURE: 141 MMHG | HEART RATE: 68 BPM

## 2020-04-01 DIAGNOSIS — F41.8 SITUATIONAL ANXIETY: Primary | ICD-10-CM

## 2020-04-01 PROCEDURE — 99213 OFFICE O/P EST LOW 20 MIN: CPT | Performed by: PHYSICIAN ASSISTANT

## 2020-04-01 RX ORDER — CHLORAL HYDRATE 500 MG
CAPSULE ORAL
COMMUNITY
End: 2022-04-15 | Stop reason: ALTCHOICE

## 2020-04-01 RX ORDER — LORAZEPAM 0.5 MG/1
0.5 TABLET ORAL EVERY 8 HOURS PRN
Qty: 30 TABLET | Refills: 0 | Status: SHIPPED | OUTPATIENT
Start: 2020-04-01 | End: 2021-05-27 | Stop reason: SDUPTHER

## 2020-11-11 ENCOUNTER — OFFICE VISIT (OUTPATIENT)
Dept: URGENT CARE | Facility: CLINIC | Age: 35
End: 2020-11-11
Payer: COMMERCIAL

## 2020-11-11 ENCOUNTER — TRANSCRIBE ORDERS (OUTPATIENT)
Dept: URGENT CARE | Facility: CLINIC | Age: 35
End: 2020-11-11

## 2020-11-11 DIAGNOSIS — Z11.59 SCREENING FOR VIRAL DISEASE: Primary | ICD-10-CM

## 2020-11-11 DIAGNOSIS — J34.89 STUFFY AND RUNNY NOSE: ICD-10-CM

## 2020-11-11 DIAGNOSIS — Z20.828 EXPOSURE TO SARS-ASSOCIATED CORONAVIRUS: ICD-10-CM

## 2020-11-11 DIAGNOSIS — Z20.828 EXPOSURE TO SARS-ASSOCIATED CORONAVIRUS: Primary | ICD-10-CM

## 2020-11-11 PROCEDURE — U0003 INFECTIOUS AGENT DETECTION BY NUCLEIC ACID (DNA OR RNA); SEVERE ACUTE RESPIRATORY SYNDROME CORONAVIRUS 2 (SARS-COV-2) (CORONAVIRUS DISEASE [COVID-19]), AMPLIFIED PROBE TECHNIQUE, MAKING USE OF HIGH THROUGHPUT TECHNOLOGIES AS DESCRIBED BY CMS-2020-01-R: HCPCS | Performed by: PHYSICIAN ASSISTANT

## 2020-11-13 LAB — SARS-COV-2 RNA SPEC QL NAA+PROBE: NOT DETECTED

## 2020-12-21 ENCOUNTER — IMMUNIZATIONS (OUTPATIENT)
Dept: FAMILY MEDICINE CLINIC | Facility: HOSPITAL | Age: 35
End: 2020-12-21
Payer: COMMERCIAL

## 2020-12-21 DIAGNOSIS — Z23 ENCOUNTER FOR IMMUNIZATION: ICD-10-CM

## 2020-12-21 PROCEDURE — 0001A SARS-COV-2 / COVID-19 MRNA VACCINE (PFIZER-BIONTECH) 30 MCG: CPT

## 2020-12-21 PROCEDURE — 91300 SARS-COV-2 / COVID-19 MRNA VACCINE (PFIZER-BIONTECH) 30 MCG: CPT

## 2021-01-11 ENCOUNTER — IMMUNIZATIONS (OUTPATIENT)
Dept: FAMILY MEDICINE CLINIC | Facility: HOSPITAL | Age: 36
End: 2021-01-11

## 2021-01-11 DIAGNOSIS — Z23 ENCOUNTER FOR IMMUNIZATION: ICD-10-CM

## 2021-01-11 PROCEDURE — 0002A SARS-COV-2 / COVID-19 MRNA VACCINE (PFIZER-BIONTECH) 30 MCG: CPT

## 2021-01-11 PROCEDURE — 91300 SARS-COV-2 / COVID-19 MRNA VACCINE (PFIZER-BIONTECH) 30 MCG: CPT

## 2021-01-14 ENCOUNTER — TELEPHONE (OUTPATIENT)
Dept: DERMATOLOGY | Facility: CLINIC | Age: 36
End: 2021-01-14

## 2021-01-14 NOTE — TELEPHONE ENCOUNTER
Patient called and left a message to schedule an appointment for concerning moles   I called and left a message for pt to call us back

## 2021-02-11 ENCOUNTER — OFFICE VISIT (OUTPATIENT)
Dept: DERMATOLOGY | Facility: CLINIC | Age: 36
End: 2021-02-11
Payer: COMMERCIAL

## 2021-02-11 VITALS — TEMPERATURE: 97.5 F | BODY MASS INDEX: 19.65 KG/M2 | WEIGHT: 145.1 LBS | HEIGHT: 72 IN

## 2021-02-11 DIAGNOSIS — D22.5 MULTIPLE BENIGN MELANOCYTIC NEVI OF UPPER AND LOWER EXTREMITIES AND TRUNK: ICD-10-CM

## 2021-02-11 DIAGNOSIS — L81.4 SOLAR LENTIGO: ICD-10-CM

## 2021-02-11 DIAGNOSIS — D22.70 MULTIPLE BENIGN MELANOCYTIC NEVI OF UPPER AND LOWER EXTREMITIES AND TRUNK: ICD-10-CM

## 2021-02-11 DIAGNOSIS — D18.01 CHERRY ANGIOMA: ICD-10-CM

## 2021-02-11 DIAGNOSIS — D22.60 MULTIPLE BENIGN MELANOCYTIC NEVI OF UPPER AND LOWER EXTREMITIES AND TRUNK: ICD-10-CM

## 2021-02-11 DIAGNOSIS — D48.9 NEOPLASM OF UNCERTAIN BEHAVIOR: Primary | ICD-10-CM

## 2021-02-11 PROCEDURE — 88305 TISSUE EXAM BY PATHOLOGIST: CPT | Performed by: STUDENT IN AN ORGANIZED HEALTH CARE EDUCATION/TRAINING PROGRAM

## 2021-02-11 PROCEDURE — 11103 TANGNTL BX SKIN EA SEP/ADDL: CPT | Performed by: DERMATOLOGY

## 2021-02-11 PROCEDURE — 1036F TOBACCO NON-USER: CPT | Performed by: DERMATOLOGY

## 2021-02-11 PROCEDURE — 3008F BODY MASS INDEX DOCD: CPT | Performed by: DERMATOLOGY

## 2021-02-11 PROCEDURE — 11102 TANGNTL BX SKIN SINGLE LES: CPT | Performed by: DERMATOLOGY

## 2021-02-11 PROCEDURE — 99204 OFFICE O/P NEW MOD 45 MIN: CPT | Performed by: DERMATOLOGY

## 2021-02-11 NOTE — PATIENT INSTRUCTIONS
ROGERS ANGIOMAS     Assessment and Plan:  Based on a thorough discussion of this condition and the management approach to it (including a comprehensive discussion of the known risks, side effects and potential benefits of treatment), the patient (family) agrees to implement the following specific plan:  · Reassure benign        SEBORRHEIC KERATOSIS; NON-INFLAMED       Assessment and Plan:  Based on a thorough discussion of this condition and the management approach to it (including a comprehensive discussion of the known risks, side effects and potential benefits of treatment), the patient (family) agrees to implement the following specific plan:  · Reassure benign  · Use sun protection  Apply SPF 30 or higher at least three times a day  Wear sun protecting clothing and hats  SOLAR LENTIGINES       Assessment and Plan:  Based on a thorough discussion of this condition and the management approach to it (including a comprehensive discussion of the known risks, side effects and potential benefits of treatment), the patient (family) agrees to implement the following specific plan:  · Reassure benign  · Use sun protection  Apply SPF 30 or higher at least three times a day  Wear sun protecting clothing and hats  MULTIPLE MELANOCYTIC NEVI ("Moles")     Assessment and Plan:  Based on a thorough discussion of this condition and the management approach to it (including a comprehensive discussion of the known risks, side effects and potential benefits of treatment), the patient (family) agrees to implement the following specific plan:  · Reassure benign  · Monitor for changes  · Use sun protection  Apply SPF 30 or higher at least three times a day  Wear sun protecting clothing and hats  Worrisome signs of skin malignancy discussed, questions answered  Regular self-skin check discussed   Advised to call or return to office if patient notices any spots of concern, rapidly growing/changing lesions, bleeding lesions, non-healing lesions  Advised regular SPF use  NEOPLASM OF UNCERTAIN BEHAVIOR OF SKIN    Assessment and Plan:   I have discussed with the patient that a sample of skin via a "skin biopsy would be potentially helpful to further make a specific diagnosis under the microscope   Based on a thorough discussion of this condition and the management approach to it (including a comprehensive discussion of the known risks, side effects and potential benefits of treatment), the patient (family) agrees to implement the following specific plan:    o Procedure:  Skin Biopsy  After a thorough discussion of treatment options and risk/benefits/alternatives (including but not limited to local pain, scarring, dyspigmentation, blistering, possible superinfection, and inability to confirm a diagnosis via histopathology), verbal and written consent were obtained and portion of the rash was biopsied for tissue sample  See below for consent that was obtained from patient and subsequent Procedure Note

## 2021-02-11 NOTE — PROGRESS NOTES
[facility] Note     Patient Name: Sharon Mercado  Encounter Date: 2/11/2021     Have you been cared for by a [facility] Dermatologist in the last 3 years and, if so, which one? No    · Have you traveled outside of the [address] [place] [address] in the past 3 months or outside of the [facility] area in the last 2 weeks? No     May we call your Preferred Phone number to discuss your specific medical information? Yes     May we leave a detailed message that includes your specific medical information? Yes      Today's Chief Concerns:   Concern #1:  Spots on top of ear and lower back    Past Medical History:  Have you personally ever had or currently have any of the following? · Skin cancer (such as Melanoma, Basal Cell Carcinoma, Squamous Cell Carcinoma? (If Yes, please provide more detail)- No  · Eczema: No  · Psoriasis: No  · HIV/AIDS: No  · Hepatitis B or C: No  · Tuberculosis: No  · Systemic Immunosuppression such as Diabetes, Biologic or Immunotherapy, Chemotherapy, Organ Transplantation, Bone Marrow Transplantation (If YES, please provide more detail): No  · Radiation Treatment (If YES, please provide more detail): No  · Any other major medical conditions/concerns? (If Yes, which types)- No      Family History:  Have any of your "first degree relatives" (parent, brother, sister, or child) had any of the following       · Skin cancer such as Melanoma or Merkel Cell Carcinoma or Pancreatic Cancer? No  · Eczema, Asthma, Hay Fever or Seasonal Allergies: YES, Seasonal allegies  · Psoriasis or Psoriatic Arthritis: No  · Do any other medical conditions seem to run in your family? If Yes, what condition and which relatives?   No    Current Medications:   (please update all dermatological medications before printing patient's AVS!)      Current Outpatient Medications:     LORazepam (ATIVAN) 0 5 mg tablet, Take 1 tablet (0 5 mg total) by mouth every 8 (eight) hours as needed for anxiety, Disp: 30 tablet, Rfl: 0    multivitamin (THERAGRAN) TABS, Take 1 tablet by mouth daily, Disp: , Rfl:     Omega-3 1000 MG CAPS, Take by mouth, Disp: , Rfl:       Review of Systems:  Have you recently had or currently have any of the following? If YES, what are you doing for the problem? · Fever, chills or unintended weight loss: No  · Sudden loss or change in your vision: No  · Nausea, vomiting or blood in your stool: No  · Painful or swollen joints: No  · Wheezing or cough: No  · Changing mole or non-healing wound: YES, Ear, back  · Nosebleeds: No  · Excessive sweating: No  · Easy or prolonged bleeding? No  · Over the last 2 weeks, how often have you been bothered by the following problems? · Taking little interest or pleasure in doing things: 1 - Not at All  · Feeling down, depressed, or hopeless: 1 - Not at All  · Rapid heartbeat with epinephrine:  No    · FEMALES ONLY:    · Are you pregnant or planning to become pregnant? No  · Are you currently or planning to be nursing or breast feeding? No    · Any known allergies? · No Known Allergies      Physical Exam:     Was a chaperone (Derm Clinical Assistant) present throughout the entire Physical Exam? Yes     Did the Dermatology Team specifically  the patient on the importance of a Full Skin Exam to be sure that nothing is missed clinically? Yes}  o Did the patient ultimately request or accept a Full Skin Exam?  Yes  o Did the patient specifically refuse to have the areas "under-the-bra" examined by the Dermatologist? No  o Did the patient specifically refuse to have the areas "under-the-underwear" examined by the Dermatologist? No    CONSTITUTIONAL:   There were no vitals filed for this visit      PSYCH: Normal mood and affect  EYES: Normal conjunctiva  ENT: Normal lips and oral mucosa  CARDIOVASCULAR: No edema  RESPIRATORY: Normal respirations  HEME/LYMPH/IMMUNO:  No regional lymphadenopathy except as noted below in "ASSESSMENT AND PLAN BY DIAGNOSIS"    SKIN:  FULL ORGAN SYSTEM EXAM   Hair, Scalp, Ears, Face Normal except as noted below in Assessment   Neck, Cervical Chain Nodes Normal except as noted below in Assessment   Right Arm/Hand/Fingers Normal except as noted below in Assessment   Left Arm/Hand/Fingers Normal except as noted below in Assessment   Chest/Breasts/Axillae Viewed areas Normal except as noted below in Assessment   Abdomen, Umbilicus Normal except as noted below in Assessment   Back/Spine Normal except as noted below in Assessment   Groin/Genitalia/Buttocks Normal except as noted below in Assessment   Right Leg, Foot, Toes Normal except as noted below in Assessment   Left Leg, Foot, Toes Normal except as noted below in Assessment        Assessment and Plan by Diagnosis:    History of Present Condition:     Duration:  How long has this been an issue for you? o  1 - 2 months   Location Affected:  Where on the body is this affecting you?    o  Ear/Back   Quality:  Is there any bleeding, pain, itch, burning/irritation, or redness associated with the skin lesion?    o  Back - itching   Severity:  Describe any bleeding, pain, itch, burning/irritation, or redness on a scale of 1 to 10 (with 10 being the worst)  o  2   Timing:  Does this condition seem to be there pretty constantly or do you notice it more at specific times throughout the day?    o  Constant   Context:  Have you ever noticed that this condition seems to be associated with specific activities you do?    o  Denies   Modifying Factors:    o Anything that seems to make the condition worse?    -  Denies  o What have you tried to do to make the condition better? -  Denies   Associated Signs and Symptoms:  Does this skin lesion seem to be associated with any of the following:  o  SL AMB DERM SIGNS AND SYMPTOMS: Itching and Scratching     1   ROGERS ANGIOMAS     Physical Exam:  · Anatomic Location Affected:  Trunk and extremites  · Morphological Description: Scattered cherry red papules  · Denies pain, itch, bleeding  No treatments tried  Present for years  Present constantly; no modifying factors which make it worse or better  Assessment and Plan:  Based on a thorough discussion of this condition and the management approach to it (including a comprehensive discussion of the known risks, side effects and potential benefits of treatment), the patient (family) agrees to implement the following specific plan:  · Reassure benign           SOLAR LENTIGINES      Physical Exam:   Anatomic Location Affected:  Sun exposed areas of back, chest, arms, legs, ears    Morphological Description:  Multiple scattered brown to tan evenly pigmented macules    Denies pain, itch, bleeding  No treatments tried  Present for months - years  Reports getting newer lesions with sun exposure  Assessment and Plan:  Based on a thorough discussion of this condition and the management approach to it (including a comprehensive discussion of the known risks, side effects and potential benefits of treatment), the patient (family) agrees to implement the following specific plan:  · Reassure benign  · Use sun protection  Apply SPF 30 or higher at least three times a day  Wear sun protecting clothing and hats  MULTIPLE MELANOCYTIC NEVI ("Moles")     Physical Exam:  · Anatomic Location Affected: Trunk and extremities  · Morphological Description:  Scattered, round to ovoid, symmetrical-appearing, even bordered, skin colored to dark brown macules/papules  · Denies pain, itch, bleeding  No treatments tried  Present for years  Present constantly; no modifying factors which make it worse or better  Denies actively changing or growing moles        Assessment and Plan:  Based on a thorough discussion of this condition and the management approach to it (including a comprehensive discussion of the known risks, side effects and potential benefits of treatment), the patient (family) agrees to implement the following specific plan:  · Reassure benign  · Monitor for changes  · Use sun protection  Apply SPF 30 or higher at least three times a day  Wear sun protecting clothing and hats  Worrisome signs of skin malignancy discussed, questions answered  Regular self-skin check discussed  Advised to call or return to office if patient notices any spots of concern, rapidly growing/changing lesions, bleeding lesions, non-healing lesions  Advised regular SPF use  NEOPLASM OF UNCERTAIN BEHAVIOR OF SKIN    Physical Exam:   (Anatomic Location); (Size and Morphological Description); (Differential Diagnosis):  o A: midlower back with skin colored papule; differential: skin tag versus nevus  o B: left upper arm with brown papule; differential atypical nevus         Assessment and Plan:   I have discussed with the patient that a sample of skin via a "skin biopsy would be potentially helpful to further make a specific diagnosis under the microscope   Based on a thorough discussion of this condition and the management approach to it (including a comprehensive discussion of the known risks, side effects and potential benefits of treatment), the patient (family) agrees to implement the following specific plan:    o Procedure:  Skin Biopsy  After a thorough discussion of treatment options and risk/benefits/alternatives (including but not limited to local pain, scarring, dyspigmentation, blistering, possible superinfection, and inability to confirm a diagnosis via histopathology), verbal and written consent were obtained and portion of the rash was biopsied for tissue sample  See below for consent that was obtained from patient and subsequent Procedure Note        PROCEDURE SHAVE BIOPSY NOTE:     Performing Physician: Adriana Corral   Anatomic Location; Clinical Description with size (cm); Pre-Op Diagnosis:   o A: midlower back with skin colored papule; differential: skin tag versus nevus  o B: left upper arm with brown papule; differential atypical nevus   Post-op diagnosis: Same      Local anesthesia: 1% xylocaine with epi       Topical anesthesia: None     Hemostasis: Aluminum chloride       After obtaining informed consent  at which time there was a discussion about the purpose of biopsy  and low risks of infection and bleeding  The area was prepped and draped in the usual fashion  Anesthesia was obtained with 1% lidocaine with epinephrine  A shave biopsy to an appropriate sampling depth was obtained with a sterile blade (such as a 15-blade or DermaBlade)  The resulting wound was covered with surgical ointment and bandaged appropriately  The patient tolerated the procedure well without complications and was without signs of functional compromise  Specimen has been sent for review by Dermatopathology  Standard post-procedure care has been explained and has been included in written form within the patient's copy of Informed Consent  INFORMED CONSENT DISCUSSION AND POST-OPERATIVE INSTRUCTIONS FOR PATIENT    I   RATIONALE FOR PROCEDURE  I understand that a skin biopsy allows the Dermatologist to test a lesion or rash under the microscope to obtain a diagnosis  It usually involves numbing the area with numbing medication and removing a small piece of skin; sometimes the area will be closed with sutures  In this specific procedure, sutures are not usually needed  If any sutures are placed, then they are usually need to be removed in 2 weeks or less  I understand that my Dermatologist recommends that a skin "shave" biopsy be performed today  A local anesthetic, similar to the kind that a dentist uses when filling a cavity, will be injected with a very small needle into the skin area to be sampled  The injected skin and tissue underneath "will go to sleep and become numb so no pain should be felt afterwards    An instrument shaped like a tiny "razor blade" (shave biopsy instrument) will be used to cut a small piece of tissue and skin from the area so that a sample of tissue can be taken and examined more closely under the microscope  A slight amount of bleeding will occur, but it will be stopped with direct pressure and a pressure bandage and any other appropriate methods  I understands that a scar will form where the wound was created  Surgical ointment will be applied to help protect the wound  Sutures are not usually needed  II   RISKS AND POTENTIAL COMPLICATIONS   I understand the risks and potential complications of a skin biopsy include but are not limited to the following:   Bleeding   Infection   Pain   Scar/keloid   Skin discoloration   Incomplete Removal   Recurrence   Nerve Damage/Numbness/Loss of Function   Allergic Reaction to Anesthesia   Biopsies are diagnostic procedures and based on findings additional treatment or evaluation may be required   Loss or destruction of specimen resulting in no additional findings    My Dermatologist has explained to me the nature of the condition, the nature of the procedure, and the benefits to be reasonably expected compared with alternative approaches  My Dermatologist has discussed the likelihood of major risks or complications of this procedure including the specific risks listed above, such as bleeding, infection, and scarring/keloid  I understand that a scar is expected after this procedure  I understand that my physician cannot predict if the scar will form a "keloid," which extends beyond the borders of the wound that is created  A keloid is a thick, painful, and bumpy scar  A keloid can be difficult to treat, as it does not always respond well to therapy, which includes injecting cortisone directly into the keloid every few weeks  While this usually reduces the pain and size of the scar, it does not eliminate it  I understand that photographs may be taken before and after the procedure    These will be maintained as part of the medical providers confidential records and may not be made available to me  I further authorize the medical provider to use the photographs for teaching purposes or to illustrate scientific papers, books, or lectures if in his/her judgment, medical research, education, or science may benefit from its use  I have had an opportunity to fully inquire about the risks and benefits of this procedure and its alternatives  I have been given ample time and opportunity to ask questions and to seek a second opinion if I wished to do so  I acknowledge that there have specifically been no guarantees as to the cosmetic results from the procedure  I am aware that with any procedure there is always the possibility of an unexpected complication  III  POST-PROCEDURAL CARE (WHAT YOU WILL NEED TO DO "AFTER THE BIOPSY" TO OPTIMIZE HEALING)     Keep the area clean and dry  Try NOT to remove the bandage or get it wet for the first 24 hours   Gently clean the area and apply surgical ointment (such as Vaseline petrolatum ointment, which is available "over the counter" and not a prescription) to the biopsy site for up to 2 weeks straight  This acts to protect the wound from the outside world   Sutures are not usually placed in this procedure  If any sutures were placed, return for suture removal as instructed (generally 1 week for the face, 2 weeks for the body)   Take Acetaminophen (Tylenol) for discomfort, if no contraindications  Ibuprofen or aspirin could make bleeding worse   Call our office immediately for signs of infection: fever, chills, increased redness, warmth, tenderness, discomfort/pain, or pus or foul smell coming from the wound  WHAT TO DO IF THERE IS ANY BLEEDING? If a small amount of bleeding is noticed, place a clean cloth over the area and apply firm pressure for ten minutes  Check the wound after 10 minutes of direct pressure    If bleeding persists, try one more time for an additional 10 minutes of direct pressure on the area  If the bleeding becomes heavier or does not stop after the second attempt, or if you have any other questions about this procedure, then please call your [facility] Dermatologist by calling [phone] (SKIN)  I hereby acknowledge that I have reviewed and verified the site with my Dermatologist and have requested and authorized my Dermatologist to proceed with the procedure                Scribe Attestation    I,:  Scout Stinson am acting as a scribe while in the presence of the attending physician :       I,:  Thony Leggett MD personally performed the services described in this documentation    as scribed in my presence :

## 2021-02-16 ENCOUNTER — TELEPHONE (OUTPATIENT)
Dept: DERMATOLOGY | Facility: CLINIC | Age: 36
End: 2021-02-16

## 2021-02-16 NOTE — TELEPHONE ENCOUNTER
Call patient to discuss biopsy results   Benign; no treatment needed   Both were normal moles as expected

## 2021-04-20 ENCOUNTER — ANNUAL EXAM (OUTPATIENT)
Dept: OBGYN CLINIC | Facility: CLINIC | Age: 36
End: 2021-04-20
Payer: COMMERCIAL

## 2021-04-20 VITALS
DIASTOLIC BLOOD PRESSURE: 82 MMHG | HEIGHT: 70 IN | WEIGHT: 145 LBS | BODY MASS INDEX: 20.76 KG/M2 | SYSTOLIC BLOOD PRESSURE: 120 MMHG

## 2021-04-20 DIAGNOSIS — Z11.51 SCREENING FOR HUMAN PAPILLOMAVIRUS (HPV): ICD-10-CM

## 2021-04-20 DIAGNOSIS — Z01.419 ENCOUNTER FOR GYNECOLOGICAL EXAMINATION (GENERAL) (ROUTINE) WITHOUT ABNORMAL FINDINGS: Primary | ICD-10-CM

## 2021-04-20 PROBLEM — I87.2 VENOUS INSUFFICIENCY: Status: ACTIVE | Noted: 2020-10-15

## 2021-04-20 PROCEDURE — 87624 HPV HI-RISK TYP POOLED RSLT: CPT | Performed by: OBSTETRICS & GYNECOLOGY

## 2021-04-20 PROCEDURE — S0612 ANNUAL GYNECOLOGICAL EXAMINA: HCPCS | Performed by: OBSTETRICS & GYNECOLOGY

## 2021-04-20 PROCEDURE — G0145 SCR C/V CYTO,THINLAYER,RESCR: HCPCS | Performed by: OBSTETRICS & GYNECOLOGY

## 2021-04-20 RX ORDER — PARSLEY 450 MG
CAPSULE ORAL 2 TIMES DAILY
COMMUNITY

## 2021-04-20 NOTE — PROGRESS NOTES
Assessment/Plan:    1  Encounter for gynecological examination (general) (routine) without abnormal findings    2  Screening for human papillomavirus (HPV)    - Liquid-based pap, screening        Subjective      Jelani Cat is a 38 y o  female who presents for annual exam  Periods are regular every 28-30 days, lasting 4 days  They had been irregular at the end of last year - felt it was do to stress of divorce and weight loss  Dysmenorrhea:mild, occurring first 1-2 days of flow  Cyclic symptoms include irritability and moodiness  No intermenstrual bleeding, spotting, or discharge  The patient denies any sexual or urinary issues  Feels like her prolapse has resolved  Current contraception: vasectomy  History of abnormal Pap smear: no  Regular self breast exam: yes  History of abnormal mammogram: no  History of abnormal lipids: no  Menstrual History:  OB History        2    Para   2    Term   2            AB        Living   2       SAB        TAB        Ectopic        Multiple   0    Live Births   2                  Patient's last menstrual period was 2021 (exact date)    Period Cycle (Days): 26  Period Duration (Days): 4  Period Pattern: Regular  Menstrual Flow: Moderate  Dysmenorrhea: (!) Mild(Cramping on day 1 only)  Dysmenorrhea Symptoms: Cramping    Past Medical History:   Diagnosis Date    Female bladder prolapse 2014    Inguinal hernia 2014    Sp[facility] bifida [facility]) 2167    Umbilical hernia 2832    Varicella     childhood       Family History   Problem Relation Age of Onset    Hypertension Mother     Arthritis Father     Hypertension Father     Arthritis Maternal Grandmother     Diabetes Maternal Grandmother     Hypertension Maternal Grandmother     Arthritis Paternal Grandmother     Diabetes Paternal Grandmother     Hypertension Paternal Grandmother        The following portions of the patient's history were reviewed and updated as appropriate: allergies, current medications, past family history, past medical history, past social history, past surgical history and problem list     Review of Systems  Pertinent items are noted in HPI  Objective      /82 (BP Location: Right arm, Patient Position: Sitting, Cuff Size: Standard)   Ht 5' 10" (1 778 m)   Wt 65 8 kg (145 lb)   LMP 04/20/2021 (Exact Date)   BMI 20 81 kg/m²     General:   alert and oriented, in no acute distress   Heart:    Breasts: regular rate and rhythm, S1, S2 normal, no murmur, click, rub or gallop   appear normal, no suspicious masses, no skin or nipple changes or axillary nodes     Lungs: clear to auscultation bilaterally   Abdomen: soft, non-tender, without masses or organomegaly   Vulva: normal   Vagina: normal mucosa   Cervix: multiparous appearance and no lesions   Uterus: normal size, mobile, non-tender   Adnexa: normal adnexa and no mass, fullness, tenderness

## 2021-04-22 LAB
HPV HR 12 DNA CVX QL NAA+PROBE: NEGATIVE
HPV16 DNA CVX QL NAA+PROBE: NEGATIVE
HPV18 DNA CVX QL NAA+PROBE: NEGATIVE

## 2021-04-26 LAB
LAB AP GYN PRIMARY INTERPRETATION: NORMAL
LAB AP LMP: NORMAL
Lab: NORMAL

## 2021-05-10 DIAGNOSIS — Z13.220 SCREENING, LIPID: Primary | ICD-10-CM

## 2021-05-10 DIAGNOSIS — Z13.1 SCREENING FOR DIABETES MELLITUS: ICD-10-CM

## 2021-05-14 ENCOUNTER — APPOINTMENT (OUTPATIENT)
Dept: LAB | Facility: CLINIC | Age: 36
End: 2021-05-14
Payer: COMMERCIAL

## 2021-05-14 DIAGNOSIS — Z13.220 SCREENING, LIPID: ICD-10-CM

## 2021-05-14 LAB
CHOLEST SERPL-MCNC: 162 MG/DL (ref 50–200)
EST. AVERAGE GLUCOSE BLD GHB EST-MCNC: 97 MG/DL
HBA1C MFR BLD: 5 %
HDLC SERPL-MCNC: 99 MG/DL
LDLC SERPL CALC-MCNC: 54 MG/DL (ref 0–100)
TRIGL SERPL-MCNC: 46 MG/DL

## 2021-05-14 PROCEDURE — 83036 HEMOGLOBIN GLYCOSYLATED A1C: CPT | Performed by: PHYSICIAN ASSISTANT

## 2021-05-14 PROCEDURE — 80061 LIPID PANEL: CPT

## 2021-05-14 PROCEDURE — 36415 COLL VENOUS BLD VENIPUNCTURE: CPT | Performed by: PHYSICIAN ASSISTANT

## 2021-05-25 ENCOUNTER — OFFICE VISIT (OUTPATIENT)
Dept: FAMILY MEDICINE CLINIC | Facility: CLINIC | Age: 36
End: 2021-05-25
Payer: COMMERCIAL

## 2021-05-25 VITALS
BODY MASS INDEX: 19.98 KG/M2 | SYSTOLIC BLOOD PRESSURE: 122 MMHG | WEIGHT: 142.7 LBS | RESPIRATION RATE: 16 BRPM | DIASTOLIC BLOOD PRESSURE: 76 MMHG | HEART RATE: 72 BPM | HEIGHT: 71 IN

## 2021-05-25 DIAGNOSIS — M54.6 ACUTE MIDLINE THORACIC BACK PAIN: ICD-10-CM

## 2021-05-25 DIAGNOSIS — Z00.00 ANNUAL PHYSICAL EXAM: Primary | ICD-10-CM

## 2021-05-25 PROCEDURE — 1036F TOBACCO NON-USER: CPT | Performed by: PHYSICIAN ASSISTANT

## 2021-05-25 PROCEDURE — 3725F SCREEN DEPRESSION PERFORMED: CPT | Performed by: PHYSICIAN ASSISTANT

## 2021-05-25 PROCEDURE — 99395 PREV VISIT EST AGE 18-39: CPT | Performed by: PHYSICIAN ASSISTANT

## 2021-05-25 PROCEDURE — 3008F BODY MASS INDEX DOCD: CPT | Performed by: PHYSICIAN ASSISTANT

## 2021-05-25 NOTE — PATIENT INSTRUCTIONS

## 2021-05-25 NOTE — PROGRESS NOTES
ADULT ANNUAL PHYSICAL  [facility]    NAME: Verna Boothe  AGE: 38 y o  SEX: female  : 1985     DATE: 2021     Assessment and Plan:     Healthy 38year old female    Immunizations and preventive care screenings were discussed with patient today  Appropriate education was printed on patient's after visit summary  Counseling:  Alcohol/drug use: discussed moderation in alcohol intake, the recommendations for healthy alcohol use, and avoidance of illicit drug use  Dental Health: discussed importance of regular tooth brushing, flossing, and dental visits  Injury prevention: discussed safety/seat belts, safety helmets, smoke detectors, carbon dioxide detectors, and smoking near bedding or upholstery  Sexual health: discussed sexually transmitted diseases, partner selection, use of condoms, avoidance of unintended pregnancy, and contraceptive alternatives  · Exercise: the importance of regular exercise/physical activity was discussed  Recommend exercise 3-5 times per week for at least 30 minutes  · Will XR thoracic spine but most likely normal for patient  · Reviewed labs         Return in 1 year (on 2022)  Chief Complaint:     Chief Complaint   Patient presents with    Physical Exam     Caring starts with you      History of Present Illness:     Adult Annual Physical   Patient here for a comprehensive physical exam  The patient reports recently   Admits her thoracic spine is more prominent and hurts when she does crunches  Diet and Physical Activity  · Diet/Nutrition: well balanced diet  · Exercise: no formal exercise        Depression Screening  PHQ-9 Depression Screening    PHQ-9:   Frequency of the following problems over the past two weeks:      Little interest or pleasure in doing things: 0 - not at all  Feeling down, depressed, or hopeless: 0 - not at all  PHQ-2 Score: 0       General Health  · Sleep: sleeps well and gets 7-8 hours of sleep on average  · Hearing: normal - bilateral   · Vision: goes for regular eye exams, most recent eye exam <1 year ago and wears contacts  · Dental: regular dental visits and brushes teeth twice daily  /GYN Health  · Last menstrual period: regular  · Contraceptive method: none  · History of STDs?: no      Review of Systems:     Review of Systems   Constitutional: Negative  HENT: Negative  Eyes: Negative  Respiratory: Negative  Cardiovascular: Negative  Gastrointestinal: Negative  Endocrine: Negative  Genitourinary: Negative  Musculoskeletal: Negative  Skin: Negative  Allergic/Immunologic: Negative  Neurological: Negative  Hematological: Negative  Psychiatric/Behavioral: Negative  Past Medical History:     Past Medical History:   Diagnosis Date    Female bladder prolapse 2014    Inguinal hernia 2014    Sp[facility] bifida [facility]) 8548    Umbilical hernia 9155    Varicella     childhood      Past Surgical History:     Past Surgical History:   Procedure Laterality Date    DILATION AND EVACUATION N/A 12/8/2017    Procedure: DILATATION AND EVACUATION (D&E);   Surgeon: Crystal Stephenson MD;  [facility];  Service: Gynecology    VASCULAR SURGERY Right 12/2020    Right leg    WISDOM TOOTH EXTRACTION  2000      Social History:     E-Cigarette/Vaping    E-Cigarette Use Never User      E-Cigarette/Vaping Substances    Nicotine No     THC No     CBD No     Flavoring No     Other No     Unknown No      Social History     Socioeconomic History    Marital status: /Civil Union     Spouse name: None    Number of children: None    Years of education: None    Highest education level: None   Occupational History    None   Social Needs    Financial resource strain: None    Food insecurity     Worry: None     Inability: None    Transportation needs     Medical: None     Non-medical: None   Tobacco Use  Smoking status: Never Smoker    Smokeless tobacco: Never Used   Substance and Sexual Activity    Alcohol use:  Yes    Drug use: No    Sexual activity: Yes     Partners: Male     Birth control/protection: Condom, Male Sterilization   Lifestyle    Physical activity     Days per week: None     Minutes per session: None    Stress: None   Relationships    Social connections     Talks on phone: None     Gets together: None     Attends Orthodoxy service: None     Active member of club or organization: None     Attends meetings of clubs or organizations: None     Relationship status: None    Intimate partner violence     Fear of current or ex partner: None     Emotionally abused: None     Physically abused: None     Forced sexual activity: None   Other Topics Concern    None   Social History Narrative    None      Family History:     Family History   Problem Relation Age of Onset    Hypertension Mother     Depression Mother     Anxiety disorder Mother     Arthritis Father     Hypertension Father     Arthritis Maternal Grandmother     Diabetes Maternal Grandmother     Hypertension Maternal Grandmother     Arthritis Paternal Grandmother     Diabetes Paternal Grandmother     Hypertension Paternal Grandmother     No Known Problems Sister     Depression Maternal Aunt     Anxiety disorder Maternal Aunt     No Known Problems Sister     Alcohol abuse Neg Hx     Substance Abuse Neg Hx       Current Medications:     Current Outpatient Medications   Medication Sig Dispense Refill    Ashwagandha 500 MG CAPS Take by mouth 2 (two) times a day      Cholecalciferol (VITAMIN D3 PO) Take by mouth      LORazepam (ATIVAN) 0 5 mg tablet Take 1 tablet (0 5 mg total) by mouth every 8 (eight) hours as needed for anxiety 30 tablet 0    Methylcellulose, Laxative, (CITRUCEL PO) Take by mouth      multivitamin (THERAGRAN) TABS Take 1 tablet by mouth daily      Omega-3 1000 MG CAPS Take by mouth       No current facility-administered medications for this visit  Allergies:     No Known Allergies   Physical Exam:     /76 (BP Location: Left arm, Patient Position: Sitting, Cuff Size: Standard)   Pulse 72   Resp 16   Ht 5' 10 75" (1 797 m)   Wt 64 7 kg (142 lb 11 2 oz)   Breastfeeding No   BMI 20 04 kg/m²     Physical Exam  Constitutional:       Appearance: Normal appearance  She is well-developed and normal weight  HENT:      Head: Normocephalic and atraumatic  Right Ear: Hearing, tympanic membrane, ear canal and external ear normal       Left Ear: Hearing, tympanic membrane, ear canal and external ear normal       Nose: Nose normal       Mouth/Throat:      Mouth: Mucous membranes are moist       Pharynx: Oropharynx is clear  Uvula midline  Eyes:      Extraocular Movements: Extraocular movements intact  Conjunctiva/sclera: Conjunctivae normal       Pupils: Pupils are equal, round, and reactive to light  Neck:      Musculoskeletal: Normal range of motion and neck supple  Thyroid: No thyromegaly  Cardiovascular:      Rate and Rhythm: Normal rate and regular rhythm  Pulses: Normal pulses  Heart sounds: Normal heart sounds  No murmur  Pulmonary:      Effort: Pulmonary effort is normal       Breath sounds: Normal breath sounds  Abdominal:      General: Abdomen is flat  Bowel sounds are normal  There is no distension  Palpations: Abdomen is soft  There is no mass  Tenderness: There is no abdominal tenderness  Musculoskeletal: Normal range of motion  Lymphadenopathy:      Cervical: No cervical adenopathy  Skin:     General: Skin is warm  Neurological:      General: No focal deficit present  Mental Status: She is alert and oriented to person, place, and time  Cranial Nerves: No cranial nerve deficit        Deep Tendon Reflexes: Reflexes normal    Psychiatric:         Mood and Affect: Mood normal          Behavior: Behavior normal          Thought Content: Thought content normal          Judgment: Judgment normal           Alfornia Dancer, PA-C [place]

## 2021-05-27 DIAGNOSIS — F41.8 SITUATIONAL ANXIETY: ICD-10-CM

## 2021-05-27 RX ORDER — LORAZEPAM 0.5 MG/1
0.5 TABLET ORAL EVERY 8 HOURS PRN
Qty: 30 TABLET | Refills: 0 | Status: SHIPPED | OUTPATIENT
Start: 2021-05-27

## 2021-06-23 ENCOUNTER — APPOINTMENT (OUTPATIENT)
Dept: RADIOLOGY | Facility: CLINIC | Age: 36
End: 2021-06-23
Payer: COMMERCIAL

## 2021-06-23 DIAGNOSIS — M54.6 ACUTE MIDLINE THORACIC BACK PAIN: ICD-10-CM

## 2021-06-23 PROCEDURE — 72072 X-RAY EXAM THORAC SPINE 3VWS: CPT

## 2021-09-13 ENCOUNTER — OFFICE VISIT (OUTPATIENT)
Dept: FAMILY MEDICINE CLINIC | Facility: CLINIC | Age: 36
End: 2021-09-13
Payer: COMMERCIAL

## 2021-09-13 VITALS
DIASTOLIC BLOOD PRESSURE: 78 MMHG | WEIGHT: 144.7 LBS | BODY MASS INDEX: 20.26 KG/M2 | HEART RATE: 67 BPM | RESPIRATION RATE: 16 BRPM | TEMPERATURE: 99 F | HEIGHT: 71 IN | SYSTOLIC BLOOD PRESSURE: 120 MMHG

## 2021-09-13 DIAGNOSIS — R10.9 ABDOMINAL PAIN, UNSPECIFIED ABDOMINAL LOCATION: ICD-10-CM

## 2021-09-13 DIAGNOSIS — R39.9 URINARY SYMPTOM OR SIGN: ICD-10-CM

## 2021-09-13 DIAGNOSIS — R19.7 DIARRHEA, UNSPECIFIED TYPE: Primary | ICD-10-CM

## 2021-09-13 LAB
BILIRUB UR QL STRIP: NEGATIVE
CLARITY UR: CLEAR
COLOR UR: YELLOW
GLUCOSE UR STRIP-MCNC: NEGATIVE MG/DL
HGB UR QL STRIP.AUTO: NEGATIVE
KETONES UR STRIP-MCNC: NEGATIVE MG/DL
LEUKOCYTE ESTERASE UR QL STRIP: NEGATIVE
NITRITE UR QL STRIP: NEGATIVE
PH UR STRIP.AUTO: 6.5 [PH]
PROT UR STRIP-MCNC: NEGATIVE MG/DL
SL AMB  POCT GLUCOSE, UA: ABNORMAL
SL AMB LEUKOCYTE ESTERASE,UA: ABNORMAL
SL AMB POCT BILIRUBIN,UA: ABNORMAL
SL AMB POCT BLOOD,UA: ABNORMAL
SL AMB POCT CLARITY,UA: CLEAR
SL AMB POCT COLOR,UA: YELLOW
SL AMB POCT KETONES,UA: ABNORMAL
SL AMB POCT NITRITE,UA: ABNORMAL
SL AMB POCT PH,UA: 5
SL AMB POCT SPECIFIC GRAVITY,UA: 1
SL AMB POCT URINE PROTEIN: ABNORMAL
SL AMB POCT UROBILINOGEN: 0.2
SP GR UR STRIP.AUTO: 1 (ref 1–1.03)
UROBILINOGEN UR QL STRIP.AUTO: 0.2 E.U./DL

## 2021-09-13 PROCEDURE — 3008F BODY MASS INDEX DOCD: CPT | Performed by: PHYSICIAN ASSISTANT

## 2021-09-13 PROCEDURE — 87086 URINE CULTURE/COLONY COUNT: CPT | Performed by: PHYSICIAN ASSISTANT

## 2021-09-13 PROCEDURE — 1036F TOBACCO NON-USER: CPT | Performed by: PHYSICIAN ASSISTANT

## 2021-09-13 PROCEDURE — 81002 URINALYSIS NONAUTO W/O SCOPE: CPT | Performed by: PHYSICIAN ASSISTANT

## 2021-09-13 PROCEDURE — 81003 URINALYSIS AUTO W/O SCOPE: CPT | Performed by: PHYSICIAN ASSISTANT

## 2021-09-13 PROCEDURE — 99214 OFFICE O/P EST MOD 30 MIN: CPT | Performed by: PHYSICIAN ASSISTANT

## 2021-09-13 NOTE — PROGRESS NOTES
Assessment/Plan:    1  Diarrhea, unspecified type    - most likely viral, will do BRAT diet, hydration, if returns will do cultures    2  Abdominal pain, unspecified abdominal location    - due to #1, no specific area tender, if RUQ achy returns will US    3  Urinary symptom or sign    - will send for u/a and culture due to blood  - POCT urine dip    F/u as needed    Subjective:   Chief Complaint   Patient presents with    Abdominal Pain     URQ    Diarrhea    Abdominal Cramping    Fatigue    Back Pain     lower back       Patient ID: Davie Mcneil is a 38 y o  female  Patient here complaining of starting with stomach cramping last night, woke up 4 x last night with "watery diarrhea"  For the past 3-4 days was having abdonimal cramping, then last night with RUQ and massive diarrhea  Now low back aching  Urinating more  Able to eat breakfast but limited coffee  Still with RUQ pain  Nasueated  Denies fever, chills  No sick contacts  Is PT works out patient, no exposure to c  Diff  No travel or antibiotics recently  The following portions of the patient's history were reviewed and updated as appropriate: allergies, current medications, past family history, past medical history, past social history, past surgical history and problem list     Past Medical History:   Diagnosis Date    Female bladder prolapse 2014    Inguinal hernia 2014    Spina bifida [facility]) 5607    Umbilical hernia 8921    Varicella     childhood     Past Surgical History:   Procedure Laterality Date    DILATION AND EVACUATION N/A 12/8/2017    Procedure: DILATATION AND EVACUATION (D&E);   Surgeon: Dilip Bravo MD;  [facility];  Service: Gynecology    VASCULAR SURGERY Right 12/2020    Right leg    WISDOM TOOTH EXTRACTION  2000     Family History   Problem Relation Age of Onset    Hypertension Mother     Depression Mother     Anxiety disorder Mother     Arthritis Father     Hypertension Father     Arthritis Maternal Grandmother     Diabetes Maternal Grandmother     Hypertension Maternal Grandmother     Arthritis Paternal Grandmother     Diabetes Paternal Grandmother     Hypertension Paternal Grandmother     No Known Problems Sister     Depression Maternal Aunt     Anxiety disorder Maternal Aunt     No Known Problems Sister     Alcohol abuse Neg Hx     Substance Abuse Neg Hx      Social History     Socioeconomic History    Marital status: /Civil Union     Spouse name: Not on file    Number of children: Not on file    Years of education: Not on file    Highest education level: Not on file   Occupational History    Not on file   Tobacco Use    Smoking status: Never Smoker    Smokeless tobacco: Never Used   Vaping Use    Vaping Use: Never used   Substance and Sexual Activity    Alcohol use: Yes    Drug use: No    Sexual activity: Yes     Partners: Male     Birth control/protection: Condom, Male Sterilization   Other Topics Concern    Not on file   Social History Narrative    Not on file     Social Determinants of Health     Financial Resource Strain:     Difficulty of Paying Living Expenses:    Food Insecurity:     Worried About Running Out of Food in the Last Year:     [address] in the Last Year:    Transportation Needs:     Lack of Transportation (Medical):      Lack of Transportation (Non-Medical):    Physical Activity:     Days of Exercise per Week:     Minutes of Exercise per Session:    Stress:     Feeling of Stress :    Social Connections:     Frequency of Communication with Friends and Family:     Frequency of Social Gatherings with Friends and Family:     Attends Caodaism Services:     Active Member of Clubs or Organizations:     Attends Club or Organization Meetings:     Marital Status:    Intimate Partner Violence:     Fear of Current or Ex-Partner:     Emotionally Abused:     Physically Abused:     Sexually Abused:        Current Outpatient Medications:    Ashwagandha 500 MG CAPS, Take by mouth 2 (two) times a day, Disp: , Rfl:     LORazepam (ATIVAN) 0 5 mg tablet, Take 1 tablet (0 5 mg total) by mouth every 8 (eight) hours as needed for anxiety, Disp: 30 tablet, Rfl: 0    Methylcellulose, Laxative, (CITRUCEL PO), Take by mouth, Disp: , Rfl:     multivitamin (THERAGRAN) TABS, Take 1 tablet by mouth daily, Disp: , Rfl:     Omega-3 1000 MG CAPS, Take by mouth, Disp: , Rfl:     Cholecalciferol (VITAMIN D3 PO), Take by mouth (Patient not taking: Reported on 9/13/2021), Disp: , Rfl:     Review of Systems          Objective:    Vitals:    09/13/21 1207   BP: 120/78   Pulse: 67   Resp: 16   Temp: 99 °F (37 2 °C)   Weight: 65 6 kg (144 lb 11 2 oz)   Height: 5' 11" (1 803 m)        Physical Exam  Constitutional:       Appearance: She is well-developed and normal weight  HENT:      Head: Normocephalic and atraumatic  Cardiovascular:      Rate and Rhythm: Normal rate and regular rhythm  Heart sounds: Normal heart sounds  Pulmonary:      Effort: Pulmonary effort is normal       Breath sounds: Normal breath sounds  Abdominal:      General: Bowel sounds are normal       Tenderness: There is generalized abdominal tenderness  There is no right CVA tenderness, left CVA tenderness, guarding or rebound  Negative signs include Jaeger's sign  Skin:     General: Skin is warm  Neurological:      General: No focal deficit present  Mental Status: She is alert and oriented to person, place, and time     Psychiatric:         Mood and Affect: Mood normal          Behavior: Behavior normal

## 2021-09-14 LAB — BACTERIA UR CULT: ABNORMAL

## 2021-10-07 ENCOUNTER — IMMUNIZATIONS (OUTPATIENT)
Dept: FAMILY MEDICINE CLINIC | Facility: HOSPITAL | Age: 36
End: 2021-10-07

## 2021-10-07 DIAGNOSIS — Z23 ENCOUNTER FOR IMMUNIZATION: Primary | ICD-10-CM

## 2021-10-07 PROCEDURE — 0001A SARS-COV-2 / COVID-19 MRNA VACCINE (PFIZER-BIONTECH) 30 MCG: CPT

## 2021-10-07 PROCEDURE — 91300 SARS-COV-2 / COVID-19 MRNA VACCINE (PFIZER-BIONTECH) 30 MCG: CPT

## 2022-04-15 ENCOUNTER — OFFICE VISIT (OUTPATIENT)
Dept: FAMILY MEDICINE CLINIC | Facility: CLINIC | Age: 37
End: 2022-04-15
Payer: COMMERCIAL

## 2022-04-15 VITALS
TEMPERATURE: 100.4 F | WEIGHT: 151 LBS | BODY MASS INDEX: 21.14 KG/M2 | HEIGHT: 71 IN | OXYGEN SATURATION: 99 % | RESPIRATION RATE: 16 BRPM | HEART RATE: 92 BPM | SYSTOLIC BLOOD PRESSURE: 118 MMHG | DIASTOLIC BLOOD PRESSURE: 80 MMHG

## 2022-04-15 DIAGNOSIS — B34.9 VIRAL ILLNESS: Primary | ICD-10-CM

## 2022-04-15 PROCEDURE — 99214 OFFICE O/P EST MOD 30 MIN: CPT | Performed by: NURSE PRACTITIONER

## 2022-04-15 PROCEDURE — 87636 SARSCOV2 & INF A&B AMP PRB: CPT | Performed by: NURSE PRACTITIONER

## 2022-04-15 RX ORDER — HYDROCODONE POLISTIREX AND CHLORPHENIRAMINE POLISTIREX 10; 8 MG/5ML; MG/5ML
5 SUSPENSION, EXTENDED RELEASE ORAL
Qty: 70 ML | Refills: 0 | Status: SHIPPED | OUTPATIENT
Start: 2022-04-15 | End: 2022-07-29 | Stop reason: ALTCHOICE

## 2022-04-15 RX ORDER — ALBUTEROL SULFATE 90 UG/1
2 AEROSOL, METERED RESPIRATORY (INHALATION) EVERY 6 HOURS PRN
Qty: 8 G | Refills: 1 | Status: SHIPPED | OUTPATIENT
Start: 2022-04-15 | End: 2022-07-29 | Stop reason: ALTCHOICE

## 2022-04-15 NOTE — PROGRESS NOTES
Assessment/Plan:     Diagnoses and all orders for this visit:    Viral illness  -     hydrocodone-chlorpheniramine polistirex (TUSSIONEX) 10-8 mg/5 mL ER suspension; Take 5 mL by mouth daily at bedtime as needed for cough Max Daily Amount: 5 mL  -     albuterol (PROVENTIL HFA,VENTOLIN HFA) 90 mcg/act inhaler; Inhale 2 puffs every 6 (six) hours as needed for wheezing or shortness of breath  -     Covid/Flu- Office Collect      Patient encouraged to  Mucinex-D and take per package instructions  She may also take Tussionex HS PRN to help calm the cough and allow her to sleep  Albuterol ordered PRN for any shortness of breath or wheezing  She is to rest, keep well hydrated and take Vitamin C, D, & zinc  We will contact her w/ results once available  Patient is encouraged to call our office for any questions/concerns, persistent or worsening symptoms  Patient states they understand and agree with treatment plan  F/u PRN  F/u pending results  Subjective:      Patient ID: Shirin You is a 39 y o  female  Patient presents for symptoms that started on Wednesday 04/13 with body aches, fever, headaches, cough, rhinorrhea w/ clear mucus, isidoro ear fullness and PND  She notes her 2 children also had fevers but had negative covid tests  She notes they are feeling better  Patient has been taking antipyretics PRN and last took Dayquil around 07:00 this morning  She notes she is able to eat and drink well  Patient admits her sleep is frequently disrupted by her cough  The following portions of the patient's history were reviewed and updated as appropriate: allergies, current medications, past family history, past medical history, past social history, past surgical history and problem list     Review of Systems   Constitutional: Positive for fever  Negative for activity change, appetite change, chills and fatigue  HENT: Positive for congestion, postnasal drip, rhinorrhea and sinus pressure   Negative for ear discharge, ear pain, sinus pain and sore throat  Respiratory: Positive for cough (not able to expectorate mucus)  Negative for chest tightness, shortness of breath and wheezing  Cardiovascular: Negative  Negative for chest pain  Gastrointestinal: Negative  Negative for diarrhea, nausea and vomiting  Genitourinary: Negative  Musculoskeletal: Positive for myalgias  Neurological: Positive for headaches  Objective:      /80   Pulse 92   Temp 100 4 °F (38 °C)   Resp 16   Ht 5' 11" (1 803 m)   Wt 68 5 kg (151 lb)   SpO2 99%   BMI 21 06 kg/m²          Physical Exam  Vitals reviewed  Constitutional:       General: She is not in acute distress  Appearance: Normal appearance  She is not ill-appearing  HENT:      Right Ear: Tympanic membrane, ear canal and external ear normal       Left Ear: Tympanic membrane, ear canal and external ear normal       Nose: Mucosal edema (clear rhinorrhea discharge) present  Right Turbinates: Not enlarged or swollen  Left Turbinates: Not enlarged or swollen  Right Sinus: No maxillary sinus tenderness or frontal sinus tenderness  Left Sinus: No maxillary sinus tenderness or frontal sinus tenderness  Mouth/Throat:      Pharynx: Oropharynx is clear  No oropharyngeal exudate or posterior oropharyngeal erythema  Cardiovascular:      Rate and Rhythm: Normal rate and regular rhythm  Pulses: Normal pulses  Heart sounds: Normal heart sounds  No murmur heard  Pulmonary:      Effort: Pulmonary effort is normal  No respiratory distress  Breath sounds: Normal breath sounds  No wheezing  Lymphadenopathy:      Cervical: No cervical adenopathy  Neurological:      Mental Status: She is alert and oriented to person, place, and time  Mental status is at baseline  Psychiatric:         Mood and Affect: Mood normal          Behavior: Behavior normal          Thought Content:  Thought content normal  Judgment: Judgment normal

## 2022-04-16 LAB
FLUAV RNA RESP QL NAA+PROBE: POSITIVE
FLUBV RNA RESP QL NAA+PROBE: NEGATIVE
SARS-COV-2 RNA RESP QL NAA+PROBE: NEGATIVE

## 2022-04-18 ENCOUNTER — TELEPHONE (OUTPATIENT)
Dept: FAMILY MEDICINE CLINIC | Facility: CLINIC | Age: 37
End: 2022-04-18

## 2022-04-18 DIAGNOSIS — J06.9 UPPER RESPIRATORY TRACT INFECTION, UNSPECIFIED TYPE: Primary | ICD-10-CM

## 2022-04-18 RX ORDER — AZITHROMYCIN 250 MG/1
TABLET, FILM COATED ORAL
Qty: 6 TABLET | Refills: 0 | Status: SHIPPED | OUTPATIENT
Start: 2022-04-18 | End: 2022-04-23

## 2022-04-18 RX ORDER — PREDNISONE 10 MG/1
TABLET ORAL
Qty: 20 TABLET | Refills: 0 | Status: SHIPPED | OUTPATIENT
Start: 2022-04-18 | End: 2022-07-29 | Stop reason: ALTCHOICE

## 2022-04-18 NOTE — TELEPHONE ENCOUNTER
----- Message from [address] [place] [address] sent at 4/18/2022  7:29 AM EDT -----  Please let patient know she tested positive for Flu A  Please ask her how she is feeling and if she needs anything from our office  Thanks!

## (undated) DEVICE — PVC URETHRAL CATHETER: Brand: DOVER

## (undated) DEVICE — D + E SUCTION CANISTER

## (undated) DEVICE — GLOVE INDICATOR PI UNDERGLOVE SZ 7.5 BLUE

## (undated) DEVICE — STRL UNIVERSAL MINOR VAGINAL: Brand: CARDINAL HEALTH

## (undated) DEVICE — GLOVE SRG BIOGEL ECLIPSE 7

## (undated) DEVICE — CURETTE VACURETTE CRVD 10MM

## (undated) DEVICE — COLLECTION SET, DISPOSABLE WITH HANDLE AND TAPERED FITTINGS TUBING, 6 FT (183 CM): Brand: GYRUS ACMI